# Patient Record
Sex: FEMALE | Race: WHITE | NOT HISPANIC OR LATINO | Employment: OTHER | URBAN - NONMETROPOLITAN AREA
[De-identification: names, ages, dates, MRNs, and addresses within clinical notes are randomized per-mention and may not be internally consistent; named-entity substitution may affect disease eponyms.]

---

## 2017-01-24 ENCOUNTER — OFFICE VISIT (OUTPATIENT)
Dept: ORTHOPEDIC SURGERY | Facility: CLINIC | Age: 62
End: 2017-01-24

## 2017-01-24 DIAGNOSIS — M17.31 POST-TRAUMATIC OSTEOARTHRITIS OF RIGHT KNEE: Primary | ICD-10-CM

## 2017-01-24 PROCEDURE — 99214 OFFICE O/P EST MOD 30 MIN: CPT | Performed by: ORTHOPAEDIC SURGERY

## 2017-01-24 RX ORDER — PYRAZINAMIDE 500 MG/1
1-2 TABLET ORAL NIGHTLY PRN
Qty: 40 TABLET | Refills: 0 | Status: SHIPPED | OUTPATIENT
Start: 2017-01-24 | End: 2017-08-10 | Stop reason: SDUPTHER

## 2017-01-24 RX ORDER — TRAMADOL HYDROCHLORIDE 50 MG/1
TABLET ORAL
Refills: 0 | COMMUNITY
Start: 2016-12-09 | End: 2017-08-10

## 2017-01-24 NOTE — MR AVS SNAPSHOT
Aubrie CRISTINO Fab   1/24/2017 1:00 PM   Office Visit    Dept Phone:  [phone]   Encounter #:  [id number]    Provider:  Bernardino Shepard MD   Department:  [facility]                Your Full Care Plan              Your Updated Medication List          This list is accurate as of: 1/24/17  1:34 PM.  Always use your most recent med list.                acetaminophen-codeine 300-30 MG per tablet   Commonly known as:  TYLENOL #3       clindamycin 1 % gel   Commonly known as:  CLINDAGEL       clonazePAM 1 MG tablet   Commonly known as:  KlonoPIN       doxepin 10 MG capsule   Commonly known as:  SINEquan       DULoxetine 20 MG capsule   Commonly known as:  CYMBALTA       FLUVIRIN suspension injection   Generic drug:  Influenza Vac Typ A&B Surf Ant       hydrOXYzine 25 MG tablet   Commonly known as:  ATARAX       indomethacin 25 MG capsule   Commonly known as:  INDOCIN       isosorbide mononitrate 30 MG 24 hr tablet   Commonly known as:  IMDUR       LEVEMIR FLEXTOUCH 100 UNIT/ML injection   Generic drug:  insulin detemir       lisinopril 2.5 MG tablet   Commonly known as:  PRINIVIL,ZESTRIL       meloxicam 15 MG tablet   Commonly known as:  MOBIC       metoprolol tartrate 50 MG tablet   Commonly known as:  LOPRESSOR       MICRONIZED COLESTIPOL HCL 1 G tablet   Generic drug:  colestipol       minocycline 100 MG capsule   Commonly known as:  MINOCIN,DYNACIN       ONGLYZA 5 MG tablet   Generic drug:  SAXagliptin       pantoprazole 40 MG EC tablet   Commonly known as:  PROTONIX       simvastatin 40 MG tablet   Commonly known as:  ZOCOR       TOUJEO SOLOSTAR 300 UNIT/ML solution pen-injector   Generic drug:  Insulin Glargine       traMADol 50 MG tablet   Commonly known as:  ULTRAM       VENTOLIN  (90 BASE) MCG/ACT inhaler   Generic drug:  albuterol       VICTOZA 18 MG/3ML solution pen-injector   Generic drug:  Liraglutide                  Instructions     None    Patient Instructions History      Upcoming Appointments     Visit Type Date Time Department    FOLLOW UP 1/24/2017  1:00 PM [facility]      [facility] Signup     Our records indicate that your [facility] account has been deactivated. If you would like to reactivate your account, please email [email or web address] or call [phone] to talk to our [facility] staff.             Other Info from Your Visit           Allergies     Contrast Dye  Itching      Reason for Visit     Right Knee - Follow-up     Results MRI RIGHT KNEE      Vital Signs     Smoking Status                   Never Smoker

## 2017-01-24 NOTE — PROGRESS NOTES
Chief Complaint   Patient presents with   • Right Knee - Follow-up   • Results     MRI RIGHT KNEE   Patient is here today for a right knee follow -up. She had an MRI done of the right knee and is here for results today.        HPI patient and his pain and discomfort on the right knee.  The knee periodically will swell up and cause her to limp.  She has difficulty with flexion of the knee and cannot squat on the ground because of the effusion.  She states it hurts in her all the time.  Because of her right knee she is limping and that is making her left knee hurt significantly as well.  She is also complaining of low back pain which sharp stabbing pain radiating into the hip associated with buttock and thigh pain.  Difficulty in going up and down the steps.  She has kept up her job at the [employer] that she finds that at the end of the day she is limping and she is barely able to keep up with her job description and requirements.  She has tried intra-articular injections, using a brace and Celebrex and all of these have helped her to some degree to tolerate her pain and discomfort but no lasting relief has been provided to the patient.  She is looking for significant improvement in her quality of life and therefore starting to me about scheduling a total knee arthroplasty on the right knee at this point.        There were no vitals filed for this visit.        Review of Systems   Constitutional: Negative for chills, diaphoresis, fever and unexpected weight change.   HENT: Negative for hearing loss, nosebleeds, sore throat and tinnitus.    Eyes: Negative for pain and visual disturbance.   Respiratory: Negative for cough, shortness of breath and wheezing.    Cardiovascular: Negative for chest pain and palpitations.   Gastrointestinal: Negative for abdominal pain, diarrhea, nausea and vomiting.   Endocrine: Negative for cold intolerance, heat intolerance and polydipsia.   Genitourinary: Negative for  difficulty urinating, dysuria and hematuria.   Musculoskeletal: Negative for arthralgias, joint swelling and myalgias.   Skin: Negative for rash and wound.   Allergic/Immunologic: Negative for environmental allergies.   Neurological: Negative for dizziness, syncope and numbness.   Hematological: Does not bruise/bleed easily.   Psychiatric/Behavioral: Negative for dysphoric mood and sleep disturbance. The patient is not nervous/anxious.            Physical Exam   Constitutional: She is oriented to person, place, and time. She appears well-developed and well-nourished.   HENT:   Head: Normocephalic.   Eyes: Conjunctivae are normal. Pupils are equal, round, and reactive to light.   Neck: Normal range of motion. Neck supple. No JVD present.   Cardiovascular: Normal rate, normal heart sounds and intact distal pulses.    Pulmonary/Chest: Effort normal and breath sounds normal. No respiratory distress.   Abdominal: Soft. Bowel sounds are normal. There is no tenderness. There is no guarding.   Lymphadenopathy:     She has no cervical adenopathy.   Neurological: She is alert and oriented to person, place, and time. She has normal reflexes.   Skin: Skin is warm and dry.   Psychiatric: She has a normal mood and affect. Her behavior is normal. Judgment and thought content normal.   Vitals reviewed.          Joint/Body Part Specific Exam:  right knee. Patient has crepitus throughout range of motion. Positive patellar grind test. Mild effusion. Lachman is negative. Pivot shift is negative. Anterior and posterior drawer signs are negative. Significant joint line tenderness is noted on the medial aspect of the knee. Patient has a varus orientation of the knee. Range of motion in flexion is for 0- 110 degrees. Neurovascular status intact.  Dorsalis pedis and posterior tibial artery pulses are palpable. Common peroneal nerve function is well preserved. Patients gait is cautious and antalgic. Skin and soft tissues are swollen;  consistent with synovitis and effusion      X-RAY Report:  Prior films are reviewed which show a complete loss of medial joint space with osteophyte formation and subchondral cyst formation      Diagnostics:  MRI reports from [facility] show progressive arthrosis on the medial compartment the knee with subchondral edema on the medial femoral condyle.  There is high-grade chondromalacia of the medial tibial plateau and the patellofemoral joint.  There is full-thickness radial tear of the posterior horn of the medial meniscus was extensively degenerative lateral meniscus.  There is also degenerative, mucinous change of the ACL.    Aubrie was seen today for results and follow-up.    Diagnoses and all orders for this visit:    Post-traumatic osteoarthritis of right knee    Other orders  -     acetaminophen-codeine (TYLENOL/CODEINE #3) 300-30 MG per tablet; Take 1-2 tablets by mouth At Night As Needed for moderate pain (4-6).          Procedures        Plan:  Patient has been struggling with her knee pain for several months and now wants to proceed with the surgical intervention.  She has a lot of pain and discomfort or day-to-day basis and she is done her best to keep up her job albeit with some restrictions.    She wants to proceed with a total aortoplasty of the right side which we will go and schedule for her.    Recent benefits discussed with patient great detail.    Time spent in the office today with the discussion of the patient, interpreting her MRI and making the recommendations of surgery along with discussion of the risks and benefits is 30 minutes.      Continue with current work status.    She can continue to work with a chiropractor for her low back pain.    Given her prescription for Tylenol with codeine No. 3 tab 1 by mouth daily at bedtime when necessary pain total 40 pills no refills.    Controlled substance treatment options discussed in detail. Patient's signed consent to medical options on file.  LYNN form in chart. Risks of narcotic medication usage outlined.  Possibility of physical and psychological dependence and abuse, especially long term, emphasized to the patient.      The patient was seen in the office today for preoperative discussion.  The patient has been tried on over-the-counter and prescription NSAID's despite the risks of anti-inflammatory bleeding, peptic ulcers and erosive gastritis with short term benefit only.  Braces have been prescribed for mechanical support.  Patient has been participating in an exercise program specifically targeting joint pain relief with limited benefit. Intraarticular injections have been used periodically with some but not complete relief of pain.  Ambulation aids have also been utilized.      The details of the surgical procedure were explained including the location of probable incisions and a description of the likely hardware/grafts to be used. The patient understands the likely convalescence after surgery as well as the rehabilitation required.  Also, we have thoroughly discussed with the patient the risks, benefits and alternatives to surgery.  Risks include but are not limited to the risk of infection, joint stiffness, limited range of motion, wound healing problems, scar tissue build up, myocardial infarction, stroke, blood clots (including DVT and/or pulmonary embolus along with the risk of death) neurologic and/or vascular injury, limb length discrepancy, fracture, dislocation, nonunion, malunion, continued pain and need for further surgery including hardware failure requiring revision.

## 2017-02-14 ENCOUNTER — TELEPHONE (OUTPATIENT)
Dept: ORTHOPEDIC SURGERY | Facility: CLINIC | Age: 62
End: 2017-02-14

## 2017-02-14 NOTE — TELEPHONE ENCOUNTER
Ok to schedule surgery as it is approved by her [employer] insurance  Im not sure what else i can offer her to minimse her symptoms?? Does she want some temporary relief with a cortisone injection Maybe?  SM

## 2017-02-14 NOTE — TELEPHONE ENCOUNTER
"Patient is asking what she needs to do in the meantime while she waits for her surgery \"it is killing me to walk while I work\" she works at [employer] in [place]. Offered her a appt on 3/9 but she states that will not do her any good. She is requesting you call her at [phone].   "

## 2017-03-02 ENCOUNTER — CLINICAL SUPPORT (OUTPATIENT)
Dept: ORTHOPEDIC SURGERY | Facility: CLINIC | Age: 62
End: 2017-03-02

## 2017-03-02 DIAGNOSIS — M17.31 POST-TRAUMATIC OSTEOARTHRITIS OF RIGHT KNEE: Primary | ICD-10-CM

## 2017-03-02 DIAGNOSIS — M17.12 PRIMARY OSTEOARTHRITIS OF LEFT KNEE: ICD-10-CM

## 2017-03-02 PROCEDURE — 20610 DRAIN/INJ JOINT/BURSA W/O US: CPT | Performed by: ORTHOPAEDIC SURGERY

## 2017-03-02 PROCEDURE — 99214 OFFICE O/P EST MOD 30 MIN: CPT | Performed by: ORTHOPAEDIC SURGERY

## 2017-03-02 RX ADMIN — METHYLPREDNISOLONE ACETATE 160 MG: 80 INJECTION, SUSPENSION INTRA-ARTICULAR; INTRALESIONAL; INTRAMUSCULAR; SOFT TISSUE at 13:33

## 2017-03-02 RX ADMIN — LIDOCAINE HYDROCHLORIDE 2 ML: 10 INJECTION, SOLUTION INFILTRATION; PERINEURAL at 13:33

## 2017-03-02 NOTE — PROGRESS NOTES
Chief Complaint   Patient presents with   • Right Knee - Follow-up           HPI the patient is here today for a follow up of her right knee. She is requesting a cortisone injection to this knee today.  She has continuous pain and discomfort in the knee.  The knee continues to buckle and give out from underneath her.  She has been struggling with this injury and its related sequelae for at least 1 year.  Patient is tried every form of conservative nonoperative care including the use of a brace, anti-inflammatory medication, intra-articular injections and physical therapy.  She has tried to go back to work and has been successful to some degree in maintaining her job at the [employer].  She now wants a more durable solution to her knee problems and wants to proceed with the option of a knee arthroplasty.  The risks and benefits have been discussed with the patient extensively.        There were no vitals filed for this visit.        Review of Systems   All other systems reviewed and are negative.          Physical Exam   Constitutional: She appears well-developed and well-nourished.   HENT:   Head: Normocephalic and atraumatic.   Eyes: EOM are normal. Pupils are equal, round, and reactive to light.   Neck: Normal range of motion. Neck supple.   Cardiovascular: Intact distal pulses.    Pulmonary/Chest: Effort normal and breath sounds normal.   Abdominal: Soft. Bowel sounds are normal.   Musculoskeletal: Normal range of motion. She exhibits tenderness. She exhibits no deformity.   Neurological: She is alert. She has normal reflexes.   Skin: Skin is warm.   Psychiatric: She has a normal mood and affect. Her behavior is normal. Thought content normal.   Nursing note and vitals reviewed.          Joint/Body Part Specific Exam:  right knee. Patient has crepitus throughout range of motion. Positive patellar grind test. Mild effusion. Lachman is negative. Pivot shift is negative. Anterior and posterior drawer signs  are negative. Significant joint line tenderness is noted on the medial aspect of the knee. Patient has a varus orientation of the knee. Range of motion in flexion is for 0- 110° degrees. Neurovascular status intact.  Dorsalis pedis and posterior tibial artery pulses are palpable. Common peroneal nerve function is well preserved. Patients gait is cautious and antalgic. Skin and soft tissues are swollen; consistent with synovitis and effusion      X-RAY Report:  Prior x-rays and MRIs are reviewed which show varus deformity the knee and significant loss of medial joint space with irregular articular surface contour and osteophyte formation      Diagnostics:        Aubrie was seen today for follow-up.    Diagnoses and all orders for this visit:    Post-traumatic osteoarthritis of right knee            Large Joint Arthrocentesis  Date/Time: 3/2/2017 1:33 PM  Consent given by: patient  Site marked: site marked  Timeout: Immediately prior to procedure a time out was called to verify the correct patient, procedure, equipment, support staff and site/side marked as required   Supporting Documentation  Indications: pain   Procedure Details  Location: knee - R knee  Preparation: Patient was prepped and draped in the usual sterile fashion  Needle size: 25 G  Approach: anteromedial  Medications administered: 2 mL lidocaine 1 %; 160 mg methylPREDNISolone acetate 80 MG/ML  Patient tolerance: patient tolerated the procedure well with no immediate complications              Plan:  Injected the patient's right knee with a steroid from an anteromedial approach.    Gentle active mobilization of the knee including the quads and the hamstrings.    I'm giving the patient half a day off today because usually after the injection in her knee flares up and become swollen and she finds it difficult to stand on a concrete floor to complete her shift.    Use a brace for support of knee function.    We are going to go ahead and schedule her for a  right total knee arthroplasty.    Risks and benefits discussed with patient great detail to the extent of 30 minutes in the office.    Tablet ibuprofen 600 mg tab 1 by mouth twice a day when necessary pain and discomfort.    Follow-up in my office after the surgical intervention.    The patient was seen in the office today for preoperative discussion.  The patient has been tried on over-the-counter and prescription NSAID's despite the risks of anti-inflammatory bleeding, peptic ulcers and erosive gastritis with short term benefit only.  Braces have been prescribed for mechanical support.  Patient has been participating in an exercise program specifically targeting joint pain relief with limited benefit. Intraarticular injections have been used periodically with some but not complete relief of pain.  Ambulation aids have also been utilized.      The details of the surgical procedure were explained including the location of probable incisions and a description of the likely hardware/grafts to be used. The patient understands the likely convalescence after surgery as well as the rehabilitation required.  Also, we have thoroughly discussed with the patient the risks, benefits and alternatives to surgery.  Risks include but are not limited to the risk of infection, joint stiffness, limited range of motion, wound healing problems, scar tissue build up, myocardial infarction, stroke, blood clots (including DVT and/or pulmonary embolus along with the risk of death) neurologic and/or vascular injury, limb length discrepancy, fracture, dislocation, nonunion, malunion, continued pain and need for further surgery including hardware failure requiring revision.

## 2017-03-08 RX ORDER — LIDOCAINE HYDROCHLORIDE 10 MG/ML
2 INJECTION, SOLUTION INFILTRATION; PERINEURAL
Status: COMPLETED | OUTPATIENT
Start: 2017-03-02 | End: 2017-03-02

## 2017-03-08 RX ORDER — METHYLPREDNISOLONE ACETATE 80 MG/ML
160 INJECTION, SUSPENSION INTRA-ARTICULAR; INTRALESIONAL; INTRAMUSCULAR; SOFT TISSUE
Status: COMPLETED | OUTPATIENT
Start: 2017-03-02 | End: 2017-03-02

## 2017-04-17 ENCOUNTER — OUTSIDE FACILITY SERVICE (OUTPATIENT)
Dept: ORTHOPEDIC SURGERY | Facility: CLINIC | Age: 62
End: 2017-04-17

## 2017-04-17 PROCEDURE — 27447 TOTAL KNEE ARTHROPLASTY: CPT | Performed by: ORTHOPAEDIC SURGERY

## 2017-04-17 PROCEDURE — 20985 CPTR-ASST DIR MS PX: CPT | Performed by: ORTHOPAEDIC SURGERY

## 2017-05-12 ENCOUNTER — TELEPHONE (OUTPATIENT)
Dept: ORTHOPEDIC SURGERY | Facility: CLINIC | Age: 62
End: 2017-05-12

## 2017-05-12 ENCOUNTER — OFFICE VISIT (OUTPATIENT)
Dept: ORTHOPEDIC SURGERY | Facility: CLINIC | Age: 62
End: 2017-05-12

## 2017-05-12 DIAGNOSIS — Z96.651 STATUS POST TOTAL KNEE REPLACEMENT, RIGHT: Primary | ICD-10-CM

## 2017-05-12 PROCEDURE — 99024 POSTOP FOLLOW-UP VISIT: CPT | Performed by: ORTHOPAEDIC SURGERY

## 2017-05-12 RX ORDER — OXYCODONE HYDROCHLORIDE AND ACETAMINOPHEN 5; 325 MG/1; MG/1
1 TABLET ORAL EVERY 8 HOURS PRN
Qty: 45 TABLET | Refills: 0 | Status: SHIPPED | OUTPATIENT
Start: 2017-05-12 | End: 2017-08-10

## 2017-05-12 RX ORDER — BACLOFEN 10 MG/1
TABLET ORAL
Qty: 180 TABLET | Refills: 0 | Status: SHIPPED | OUTPATIENT
Start: 2017-05-12 | End: 2017-08-08 | Stop reason: SDUPTHER

## 2017-05-17 PROBLEM — Z96.651 STATUS POST TOTAL KNEE REPLACEMENT, RIGHT: Status: ACTIVE | Noted: 2017-05-17

## 2017-06-13 ENCOUNTER — OFFICE VISIT (OUTPATIENT)
Dept: ORTHOPEDIC SURGERY | Facility: CLINIC | Age: 62
End: 2017-06-13

## 2017-06-13 DIAGNOSIS — Z96.651 STATUS POST TOTAL KNEE REPLACEMENT, RIGHT: Primary | ICD-10-CM

## 2017-06-13 PROCEDURE — 99024 POSTOP FOLLOW-UP VISIT: CPT | Performed by: ORTHOPAEDIC SURGERY

## 2017-06-13 RX ORDER — FERROUS SULFATE 325(65) MG
TABLET ORAL
Refills: 0 | COMMUNITY
Start: 2017-05-02 | End: 2021-08-13

## 2017-06-13 RX ORDER — BUSPIRONE HYDROCHLORIDE 15 MG/1
TABLET ORAL
Refills: 3 | COMMUNITY
Start: 2017-05-18 | End: 2021-08-13

## 2017-06-13 RX ORDER — OXYCODONE AND ACETAMINOPHEN 10; 325 MG/1; MG/1
TABLET ORAL
Refills: 0 | COMMUNITY
Start: 2017-04-05 | End: 2017-08-10

## 2017-06-13 RX ORDER — SENNA-LAX 8.6 MG/1
TABLET ORAL
Refills: 0 | COMMUNITY
Start: 2017-05-10 | End: 2021-08-13

## 2017-06-13 RX ORDER — PAROXETINE HYDROCHLORIDE 20 MG/1
TABLET, FILM COATED ORAL
Refills: 1 | COMMUNITY
Start: 2017-03-31 | End: 2021-07-21 | Stop reason: SDUPTHER

## 2017-06-13 RX ORDER — PROMETHAZINE HYDROCHLORIDE 25 MG/1
TABLET ORAL
Refills: 0 | COMMUNITY
Start: 2017-04-05 | End: 2021-08-13

## 2017-06-13 RX ORDER — MONTELUKAST SODIUM 10 MG/1
10 TABLET ORAL DAILY
Refills: 2 | COMMUNITY
Start: 2017-05-18

## 2017-06-13 NOTE — PROGRESS NOTES
Chief Complaint   Patient presents with   • Right Knee - Follow-up         HPI  Patient returns for a post operative follow up of her right knee.  Patient had a total knee arthroplasty performed on 17 April 2017.  She is doing extremely well at this point.  His swelling and bruising about was completely settled down.  Her range of motion is progressively improving with physical therapy.  She does not have any significant pain at this point.  In fact she states she is not using any of her narcotic pain medication to help manage her symptoms.  She is thinking about it returning back to work possibly in the next 3-4 weeks based on her progress.  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        There were no vitals filed for this visit.        Joint/Body Part Specific Exam:    right knee.The patient is status post total knee arthroplasty postoperative 7 week(s). Incision is clean. Calf is soft and nontender. Homans sign is negative. There is no clicking, popping or catching. Anterior and posterior drawer signs are negative, Appropriate amounts of swelling and bruising are noted. Dorsalis pedis and posterior tibial artery pulses are palpable. Common peroneal nerve function is well preserved. Range of motion is from 5- 105° degrees. Gait is cautious but otherwise fairly normal. There is no evidence of a deep seated joint infection.    X-RAY REPORT:        Aubrie was seen today for follow-up.    Diagnoses and all orders for this visit:    Status post total knee replacement, right          Procedures        Instructions:      Plan:Incision care.    Stretching and strengthening exercises of the quads and the hamstrings.    Tablet ibuprofen 6 mg tab 1 by mouth twice a day when necessary breakthrough pain.    She is not taking any pain  medications that contain narcotic at this point.    I would recommend her using a Jobst compression garment stocking, long, above the knee and with 20-30° compression.    Patient needs to consider going back to work as a  worker with a sit down type of job.    Continue with aggressive outpatient physical therapy.    Patient's Workmen's Comp. Nurse,Ailyn De Santiago RN, is here from [facility] to ask questions about the progress and postoperative needs for physical therapy for this patient.  She is also here to determine return to work and time off work issues.      Follow-up in my office in 4 weeks.

## 2017-07-13 ENCOUNTER — OFFICE VISIT (OUTPATIENT)
Dept: ORTHOPEDIC SURGERY | Facility: CLINIC | Age: 62
End: 2017-07-13

## 2017-07-13 DIAGNOSIS — Z96.651 STATUS POST TOTAL KNEE REPLACEMENT, RIGHT: Primary | ICD-10-CM

## 2017-07-13 PROCEDURE — 73560 X-RAY EXAM OF KNEE 1 OR 2: CPT | Performed by: ORTHOPAEDIC SURGERY

## 2017-07-13 PROCEDURE — 99024 POSTOP FOLLOW-UP VISIT: CPT | Performed by: ORTHOPAEDIC SURGERY

## 2017-07-13 NOTE — PROGRESS NOTES
Chief Complaint   Patient presents with   • Right Knee - Follow-up         HPI  The patient is here today for a follow up of her right total knee arthroplasty that was done on 4/17/17.  Patient has made good progress postoperatively.  It appears that her physical therapy has plateaued out at this point.  She is able to flex with maximal effort about 112° but with her own effort she can go up to about 105°.  The patient complains of some stiffness on the lateral side of the knee joint after a night's rest.  She is also complaining of some numbness and tingling on the lateral 2 toes.  This is possibly related to scar tissue in the vicinity of the common peroneal nerve.  At this point my recommendation would be to go ahead with a manipulation of the knee under anesthesia to break down some of the adhesions and to allow her about 5-7 more degrees of flexion.  I do feel like this will improve her gait patterns and facilitate a quicker return to work.  The the manipulation of the knee will be followed by aggressive daily physical therapy for a week followed by 3 times a week physical therapy for the next 4-6 weeks.  We have discussed that with the patient and her workman's compensation rehabilitation nurse was present in the office today.      There were no vitals filed for this visit.        Joint/Body Part Specific Exam:  right knee.The patient is status post total knee arthroplasty postoperative 3 month(s). Incision is clean. Calf is soft and nontender. Homans sign is negative. There is no clicking, popping or catching. Anterior and posterior drawer signs are negative, Appropriate amounts of swelling and bruising are noted. Dorsalis pedis and posterior tibial artery pulses are palpable. Common peroneal nerve function is well preserved. Range of motion is from 0- 105° degrees. Gait is cautious but otherwise fairly normal. There is no evidence of a deep seated joint infection.      X-RAY REPORT:  right Knee  X-Ray  Indication: Right knee evaluation of implant position after total knee arthroplasty  AP, Lateral views  Findings: Well placed implants with a good cement mantle without any subsidence  no bony lesion  Soft tissues within normal limits  within normal limits joint spaces  Hardware appropriately positioned yes      yes prior studies available for comparison.    X-RAY was ordered and reviewed by Bernardino Shepard MD      Aubrie was seen today for follow-up.    Diagnoses and all orders for this visit:    Status post total knee replacement, right  -     XR Knee 1 or 2 View Right          Procedures        Instructions:      Plan:Continue to wear the Jobst compression garment stocking to decrease the swelling and to minimize the possibility of DVT.    Continue with aggressive physical therapy with stretching sensing exercises.    I am recommending that we schedule this patient for a right knee manipulation under anesthesia and a block to break down some of the adhesions and to improve her range of motion in flexion by about 5 more degrees.    This manipulation will help to decrease her pain which is related to scar tissue and also to improve her range of motion to allow an earlier return to a light duty work.    The manipulation under anesthesia will be followed by aggressive physical therapy to help improve the overall dynamics of knee function and gait and danitza patterns.    Tablet ibuprofen 600 mg tab 1 by mouth twice a day when necessary pain and discomfort.    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    Patients workman's comp nurse, Ailyn Mcclellan RN from [place] is here today and all questions have been answered for her.  She will try and facilitate the manipulation procedure for the patient which we would like to accomplish ASAP.  Time spent in the office today with the patient and her workman's comp nurse  discussing different treatment options and conversation with the patient's physical therapist Mohsen Tay is 30 minutes.

## 2017-07-14 ENCOUNTER — TELEPHONE (OUTPATIENT)
Dept: ORTHOPEDIC SURGERY | Facility: CLINIC | Age: 62
End: 2017-07-14

## 2017-07-14 NOTE — TELEPHONE ENCOUNTER
SPOKE WITH [employer]EVER ROJAS () FOR [facility]. SHE STATED THAT SHE DID REQUEST APPROVAL FOR THE RIGHT KNEE MANIPULATION AND HAD FORWARDED THE OFFICE NOTES TO ROSANNE ROBBINS YESTERDAY BUT HAD NOT HEARD ANYTHING BACK FROM HER YET.I CALLED ROSANNE [phone] AND HAD TO LEAVE A MESSAGE ASKING THAT SHE RETURN MY CALL TO LET ME KNOW THE STATUS OF THE REQUEST.

## 2017-07-17 ENCOUNTER — OUTSIDE FACILITY SERVICE (OUTPATIENT)
Dept: ORTHOPEDIC SURGERY | Facility: CLINIC | Age: 62
End: 2017-07-17

## 2017-07-17 PROCEDURE — 27570 FIXATION OF KNEE JOINT: CPT | Performed by: ORTHOPAEDIC SURGERY

## 2017-07-25 ENCOUNTER — TELEPHONE (OUTPATIENT)
Dept: ORTHOPEDIC SURGERY | Facility: CLINIC | Age: 62
End: 2017-07-25

## 2017-07-31 ENCOUNTER — TELEPHONE (OUTPATIENT)
Dept: ORTHOPEDIC SURGERY | Facility: CLINIC | Age: 62
End: 2017-07-31

## 2017-08-01 ENCOUNTER — TELEPHONE (OUTPATIENT)
Dept: ORTHOPEDIC SURGERY | Facility: CLINIC | Age: 62
End: 2017-08-01

## 2017-08-07 ENCOUNTER — TELEPHONE (OUTPATIENT)
Dept: ORTHOPEDIC SURGERY | Facility: CLINIC | Age: 62
End: 2017-08-07

## 2017-08-07 NOTE — TELEPHONE ENCOUNTER
Patient states she is still on pain medication I have called Ailyn and left a voicemail stating the patient will need transportation until re-evaluated on 8/10/17 due to her still taking pain medication.

## 2017-08-07 NOTE — TELEPHONE ENCOUNTER
Work Comp is asking if the patient can drive at this point please advise. They are still providing her with transportation but if it is not needed then they will d/c this.     [phone] Ailyn Dang RN Case Manager.

## 2017-08-08 ENCOUNTER — TELEPHONE (OUTPATIENT)
Dept: ORTHOPEDIC SURGERY | Facility: CLINIC | Age: 62
End: 2017-08-08

## 2017-08-08 RX ORDER — BACLOFEN 10 MG/1
10 TABLET ORAL 2 TIMES DAILY
Qty: 180 TABLET | Refills: 0 | Status: SHIPPED | OUTPATIENT
Start: 2017-08-08 | End: 2017-11-10 | Stop reason: SDUPTHER

## 2017-08-10 ENCOUNTER — OFFICE VISIT (OUTPATIENT)
Dept: ORTHOPEDIC SURGERY | Facility: CLINIC | Age: 62
End: 2017-08-10

## 2017-08-10 DIAGNOSIS — M17.31 POST-TRAUMATIC OSTEOARTHRITIS OF RIGHT KNEE: Primary | ICD-10-CM

## 2017-08-10 DIAGNOSIS — Z96.651 STATUS POST TOTAL KNEE REPLACEMENT, RIGHT: ICD-10-CM

## 2017-08-10 PROCEDURE — 99213 OFFICE O/P EST LOW 20 MIN: CPT | Performed by: ORTHOPAEDIC SURGERY

## 2017-08-10 RX ORDER — PYRAZINAMIDE 500 MG/1
1 TABLET ORAL EVERY 12 HOURS PRN
Qty: 40 TABLET | Refills: 0 | Status: SHIPPED | OUTPATIENT
Start: 2017-08-10 | End: 2021-08-13

## 2017-08-10 RX ORDER — DOXEPIN HYDROCHLORIDE 25 MG/1
CAPSULE ORAL
Refills: 1 | COMMUNITY
Start: 2017-07-21 | End: 2021-08-13

## 2017-08-10 RX ORDER — CEFDINIR 300 MG/1
CAPSULE ORAL
Refills: 1 | COMMUNITY
Start: 2017-06-23 | End: 2021-08-13

## 2017-08-10 RX ORDER — FLUTICASONE PROPIONATE 50 MCG
SPRAY, SUSPENSION (ML) NASAL
Refills: 2 | COMMUNITY
Start: 2017-06-16 | End: 2021-08-13

## 2017-08-10 RX ORDER — VORTIOXETINE 10 MG/1
TABLET, FILM COATED ORAL
Refills: 1 | COMMUNITY
Start: 2017-06-22 | End: 2021-08-13

## 2017-08-10 RX ORDER — HYDROCODONE BITARTRATE AND ACETAMINOPHEN 5; 325 MG/1; MG/1
TABLET ORAL
Refills: 0 | COMMUNITY
Start: 2017-07-17 | End: 2017-08-10

## 2017-08-10 RX ORDER — ONDANSETRON 4 MG/1
TABLET, FILM COATED ORAL
Refills: 0 | COMMUNITY
Start: 2017-07-17 | End: 2021-08-13

## 2017-08-10 NOTE — PROGRESS NOTES
Chief Complaint   Patient presents with   • Right Knee - Follow-up   • Post-op Knee     RIGHT KNEE MANIPULATION   Patient is here today for a follow-up after a right knee manipulation.      HPI patient is doing well in terms of improvement in range of motion.  She had her index knee replacement on 17 April 2017.  This was followed by a knee manipulation on 17 July 2017.  We manipulated the knee to overcome  the arthrofibrosis and to improve the range of motion.  There was a significant breaking down of the scar tissue in the perioperative period and she has made very significant improvement in terms of improvement so for range of motion and decreased pain subsequent to the manipulation.        There were no vitals filed for this visit.        Joint/Body Part Specific Exam:  right knee.The patient is status post total knee arthroplasty postoperative 4 month(s). Incision is clean. Calf is soft and nontender. Homans sign is negative. There is no clicking, popping or catching. Anterior and posterior drawer signs are negative, Appropriate amounts of swelling and bruising are noted. Dorsalis pedis and posterior tibial artery pulses are palpable. Common peroneal nerve function is well preserved. Range of motion is from 5- 105 degrees. Gait is cautious but otherwise fairly normal. There is no evidence of a deep seated joint infection.      X-RAY REPORT:        Aubrie was seen today for post-op knee and follow-up.    Diagnoses and all orders for this visit:    Post-traumatic osteoarthritis of right knee    Status post total knee replacement, right    Other orders  -     acetaminophen-codeine (TYLENOL/CODEINE #3) 300-30 MG per tablet; Take 1 tablet by mouth Every 12 (Twelve) Hours As Needed for Moderate Pain (4-6).          Procedures        Instructions:      Plan:Continue with aggressive physical therapy including stretching and strengthening exercises.    Long DAPHNEY hose stockings to decrease swelling and minimize the  possibility of DVT.    Tablet ibuprofen 600 mg tab 1 by mouth twice a day when necessary pain and discomfort.    Tablet Tylenol with Codeine No. 3 tab 1 by mouth every 12 hours when necessary pain and discomfort for breakthrough pain.    Continue with aggressive home based physical therapy program.    Patient is currently off work so that she can focus on physical therapy and Prepared to return to work at her next visit in 4 weeks.    Patients workman's compensation nurse, Ailyn Mcclellan RN, from [facility] is here and has discussed the progress of the patient and potential return to work.    Follow-up in 4 weeks for reevaluation.    The patient will continue with physical therapy on an outpatient basis.    Controlled substance treatment options discussed in detail. Patient's signed consent to medical options on file. LYNN form in chart. Risks of narcotic medication usage outlined.  Possibility of physical and psychological dependence and abuse, especially long term, emphasized to the patient.  I have explained to the patient that we will try to wean them off the narcotic medication as soon as possible and introduce non-narcotic modalities of pain control.  I have also discussed the possibility of random drug testing as well as pill counts to prevent misuse and misappropriation of the narcotic medications prescribed to the patient.

## 2017-08-31 DIAGNOSIS — Z96.651 STATUS POST TOTAL KNEE REPLACEMENT, RIGHT: Primary | ICD-10-CM

## 2017-08-31 DIAGNOSIS — M17.31 POST-TRAUMATIC OSTEOARTHRITIS OF RIGHT KNEE: ICD-10-CM

## 2017-09-07 ENCOUNTER — OFFICE VISIT (OUTPATIENT)
Dept: ORTHOPEDIC SURGERY | Facility: CLINIC | Age: 62
End: 2017-09-07

## 2017-09-07 DIAGNOSIS — Z96.651 STATUS POST TOTAL KNEE REPLACEMENT, RIGHT: Primary | ICD-10-CM

## 2017-09-07 PROCEDURE — 99213 OFFICE O/P EST LOW 20 MIN: CPT | Performed by: ORTHOPAEDIC SURGERY

## 2017-09-07 NOTE — PROGRESS NOTES
Chief Complaint   Patient presents with   • Right Knee - Follow-up         HPI the patient is here today for a follow up of her right total knee arthroplasty. She had this knee replaced on 4/17/17 and a manipulation on 7/17/17.  Patient is doing a whole lot better than before.  The active range of motion with physical therapy and the manipulation helped her tremendously.  Her range of motion is improving significantly.  She is ambulating without too much pain at this point.  She is talking to me about being released to go back to work to a light duty schedule.  She is unable to work full-time on her regular duties at this point because of the pain and discomfort that she experiences by the end of the day on a concrete floor.  However she is amenable to returning back to a light duty schedule.  We will discuss this with the patient's Workmen's Compensation nurse from [facility] today.        There were no vitals filed for this visit.        Joint/Body Part Specific Exam:  right knee.The patient is status post total knee arthroplasty postoperative 5 month(s). Incision is clean. Calf is soft and nontender. Homans sign is negative. There is no clicking, popping or catching. Anterior and posterior drawer signs are negative, Appropriate amounts of swelling and bruising are noted. Dorsalis pedis and posterior tibial artery pulses are palpable. Common peroneal nerve function is well preserved. Range of motion is from 0- 120° degrees. Gait is cautious but otherwise fairly normal. There is no evidence of a deep seated joint infection.  Patients gait pattern has improved significantly.  She does have some tenderness posteriorly over the popliteal fossa.      X-RAY REPORT:        Aubrie was seen today for follow-up.    Diagnoses and all orders for this visit:    Status post total knee replacement, right          Procedures        Instructions:      Plan: Continue with aggressive physical therapy with stretching and  strengthening exercises of the quads and the hamstrings.    Tablet ibuprofen 600 mg by mouth twice a day p.m. pain and discomfort.    Calcium and vitamin D for bone health.    , GI and dental procedure prophylaxis with antibiotics to prevent a metastatic infection to the knee arthroplasty implants.    Patient states that there is no light duty available at her job at the [employer].    We are giving the patient a note to return to light duty with affect from 11 September 2017.  There will be no squatting, crawling or climbing on ladders or scaffolds.  She will be limited to lifting and maximum load of 20 pounds and no more.  These restrictions are in place till her next follow-up in 6 weeks.    Patients workman's compensation nurse, Melissa Helton RN, is present here in the office today and all these issues have been discussed with her in great detail.    Follow-up in my office in 6 weeks for reevaluation.

## 2017-10-19 ENCOUNTER — OFFICE VISIT (OUTPATIENT)
Dept: ORTHOPEDIC SURGERY | Facility: CLINIC | Age: 62
End: 2017-10-19

## 2017-10-19 DIAGNOSIS — Z96.651 S/P TOTAL KNEE ARTHROPLASTY, RIGHT: Primary | ICD-10-CM

## 2017-10-19 DIAGNOSIS — Z96.651 STATUS POST TOTAL KNEE REPLACEMENT, RIGHT: ICD-10-CM

## 2017-10-19 PROCEDURE — 99213 OFFICE O/P EST LOW 20 MIN: CPT | Performed by: ORTHOPAEDIC SURGERY

## 2017-10-19 PROCEDURE — 73560 X-RAY EXAM OF KNEE 1 OR 2: CPT | Performed by: ORTHOPAEDIC SURGERY

## 2017-10-19 NOTE — PROGRESS NOTES
Chief Complaint   Patient presents with   • Right Knee - Follow-up           HPI the patient is here today for a follow up of her right total knee arthroplasty (4/17/17) and manipulation (7/17/17)Off the knee for arthrofibrosis.  She is doing quite well at this point.  The swelling and pain have decreased considerably.  She is not taking any narcotic medication to help with the pain and symptoms.  Patient has returned back to work at her job at the [employer].  She is working 2 days a week and seems to be doing okay with that work load.  By the end of the day, she does have pain and discomfort in the lumbar spine which causes her to get tired.  The knee replacement itself is doing well for her and her walking distances have improved considerably.  She feels that her quality of life is a whole lot better after the knee replacement surgery compared to the pain and instability she had in the knee prior to the surgical intervention.        There were no vitals filed for this visit.        Review of Systems   Constitutional: Negative.    HENT: Negative.    Eyes: Negative.    Respiratory: Negative.    Cardiovascular: Negative.    Gastrointestinal: Negative.    Endocrine: Negative.    Genitourinary: Negative.    Musculoskeletal: Negative.    Skin: Negative.    Allergic/Immunologic: Negative.    Neurological: Negative.    Hematological: Negative.    Psychiatric/Behavioral: Negative.            Physical Exam   Constitutional: She is oriented to person, place, and time. She appears well-nourished.   HENT:   Head: Atraumatic.   Eyes: EOM are normal.   Neck: Neck supple.   Cardiovascular: Intact distal pulses.    Pulmonary/Chest: Breath sounds normal.   Abdominal: Bowel sounds are normal.   Musculoskeletal: She exhibits edema and tenderness. She exhibits no deformity.   Neurological: She is alert and oriented to person, place, and time. She has normal reflexes.   Skin: Skin is dry.   Psychiatric: She has a normal mood and  affect. Her behavior is normal. Judgment and thought content normal.   Nursing note and vitals reviewed.          Joint/Body Part Specific Exam:  right knee.The patient is status post total knee arthroplasty postoperative 6 month(s). Incision is clean. Calf is soft and nontender. Homans sign is negative. There is no clicking, popping or catching. Anterior and posterior drawer signs are negative.  There is no instability of the components. Appropriate amounts of swelling and bruising are noted. Dorsalis pedis and posterior tibial artery pulses are palpable. Common peroneal nerve function is well preserved. Range of motion is from 0- 110° degrees of flexion. Gait is cautious but otherwise fairly normal. There is no evidence of a deep seated joint infection.  Overall she is very pleased with her outcome because her gait pattern has improved tremendously.      X-RAY Report:  right Knee X-Ray  Indication: Implant position evaluation after total knee arthro-plasty  AP, Lateral views  Findings: Well placed implants with a good cement mantle without any subsidence  no bony lesion  Soft tissues within normal limits  within normal limits joint spaces  Hardware appropriately positioned yes      yes prior studies available for comparison.    X-RAY was ordered and reviewed by Bernardino Shepard MD      Diagnostics:        Aubrie was seen today for follow-up.    Diagnoses and all orders for this visit:    S/P total knee arthroplasty, right  -     XR Knee 1 or 2 View Right    Status post total knee replacement, right          Procedures        Plan:  Continue with aggressive stretching and strengthening exercises of the knee including stretching of the quads and the hamstrings.    Tablet ibuprofen 600 mg orally twice a day for pain and discomfort.    , GI and dental procedure prophylaxis with antibiotics to prevent a metastatic infection to the knee arthroplasty implants.    [employer] exercise to keep the low back supple  and to prevent it from hurting by the end of the day.    Patient is back working in her job at "[employer] 2 days a week and she will continue that same work status at this point.    Jobst compression garment stocking to help decrease the swelling and minimize the possibility of DVT.    Follow-up in my office in 3 months for reevaluation.

## 2017-10-24 PROBLEM — Z96.651 S/P TOTAL KNEE ARTHROPLASTY, RIGHT: Status: ACTIVE | Noted: 2017-10-24

## 2017-10-26 ENCOUNTER — TELEPHONE (OUTPATIENT)
Dept: ORTHOPEDIC SURGERY | Facility: CLINIC | Age: 62
End: 2017-10-26

## 2017-11-10 RX ORDER — BACLOFEN 10 MG/1
TABLET ORAL
Qty: 180 TABLET | Refills: 0 | Status: SHIPPED | OUTPATIENT
Start: 2017-11-10 | End: 2018-01-25

## 2017-12-18 ENCOUNTER — OFFICE VISIT (OUTPATIENT)
Dept: ORTHOPEDIC SURGERY | Facility: CLINIC | Age: 62
End: 2017-12-18

## 2017-12-18 DIAGNOSIS — Z96.651 STATUS POST TOTAL KNEE REPLACEMENT, RIGHT: Primary | ICD-10-CM

## 2017-12-18 PROCEDURE — 99214 OFFICE O/P EST MOD 30 MIN: CPT | Performed by: ORTHOPAEDIC SURGERY

## 2017-12-18 NOTE — PROGRESS NOTES
Chief Complaint   Patient presents with   • Right Knee - Follow-up     TKA 4/17/17           HPI the patient is here today for a follow up of her right total knee arthroplasty that was done on 04/17/17.  Patient had developed some arthrofibrosis postoperatively which was treated with manipulation.  She is doing extremely well at this point.  Her range of motion is improved tremendously.  She walks regularly for exercise.  The patient has gone back to work at her job at [employer] with some restrictions.  At this point these restrictions are limited to not allowing her to crawl or climb ladders.  There is no squatting allowed for this patient and maximum lifting allowed us 20 pounds.  She is not using any narcotics for pain medication to control symptoms around the knee.  She does have significant amount of chronic low back pain which radiates into the lower extremity.  The patient is seeking chiropractic care for that symptom.  She is also using baclofen as an anti-spasmodic agent for muscle spasms in the lower extremity.  We have discussed about progressive improvement with the patient and eventually she will get the status of a MRI at the one-year fausto from surgery.        There were no vitals filed for this visit.        Review of Systems   Constitutional: Negative.    HENT: Negative.    Eyes: Negative.    Respiratory: Negative.    Cardiovascular: Negative.    Gastrointestinal: Negative.    Endocrine: Negative.    Genitourinary: Negative.    Musculoskeletal: Negative.    Skin: Negative.    Allergic/Immunologic: Negative.    Neurological: Negative.    Hematological: Negative.    Psychiatric/Behavioral: Negative.            Physical Exam   Constitutional: She is oriented to person, place, and time. She appears well-nourished.   HENT:   Head: Atraumatic.   Eyes: EOM are normal.   Neck: Neck supple.   Cardiovascular: Normal rate, regular rhythm, normal heart sounds and intact distal pulses.    Pulmonary/Chest:  Effort normal and breath sounds normal.   Abdominal: Soft. Bowel sounds are normal.   Musculoskeletal: She exhibits edema and tenderness.   Neurological: She is alert and oriented to person, place, and time. She has normal reflexes.   Skin: Skin is dry.   Psychiatric: She has a normal mood and affect. Her behavior is normal. Judgment and thought content normal.   Nursing note and vitals reviewed.          Joint/Body Part Specific Exam:  right knee.The patient is status post total knee arthroplasty postoperative 8 month(s). Incision is clean. Calf is soft and nontender. Homans sign is negative. There is no clicking, popping or catching. Anterior and posterior drawer signs are negative.  There is no instability of the components. Appropriate amounts of swelling and bruising are noted. Dorsalis pedis and posterior tibial artery pulses are palpable. Common peroneal nerve function is well preserved. Range of motion is from 0- 125° degrees of flexion. Gait is cautious but otherwise fairly normal. There is no evidence of a deep seated joint infection.      X-RAY Report:        Diagnostics:        Aubrie was seen today for follow-up.    Diagnoses and all orders for this visit:    Status post total knee replacement, right          Procedures        Plan:  Stretching and strengthening exercises will continue rehabilitation of the quads and the hamstrings.    Jobst compression garment stocking to help decrease the swelling and edema.    Tablet ibuprofen 600 mg orally twice a day for pain and discomfort.    She is using baclofen for the pain and spasms that come out of her low back and affect the lower extremity.    She will have to see a chiropractic practitioner to help her with her low back pain.    , GI and dental procedure prophylaxis with antibiotics to prevent a metastatic infection to the knee implants.    She is currently working at [employer] with restrictions.  Her current restrictions include no climbing ladders or  scaffolds, no squatting or crawling on the ground and maximum lifting of 20 pounds to minimize injury to the operative knee.    Follow-up in my office in 6 weeks.    Patient's Workmen's Comp. carrier representative,Ailyn Dang RN,BSN is here today to discuss the patient's work limitations as well as continued physical therapy and potential for maximal medical improvement.  Time spent in this discussion today with the patient and her Workmen's Comp. nurse is 30 minutes of which greater than 50% of the time was spent face-to-face discussing the treatment options which at this point are temporary in nature.  We have also discussed about making those restrictions of a more permanent nature at her next visit.

## 2017-12-22 PROBLEM — G56.02 CARPAL TUNNEL SYNDROME OF LEFT WRIST: Status: ACTIVE | Noted: 2017-12-22

## 2017-12-22 PROBLEM — G56.21 ULNAR NEURITIS, RIGHT: Status: ACTIVE | Noted: 2017-12-22

## 2018-01-25 ENCOUNTER — OFFICE VISIT (OUTPATIENT)
Dept: ORTHOPEDIC SURGERY | Facility: CLINIC | Age: 63
End: 2018-01-25

## 2018-01-25 DIAGNOSIS — Z96.651 S/P TOTAL KNEE ARTHROPLASTY, RIGHT: Primary | ICD-10-CM

## 2018-01-25 DIAGNOSIS — M54.16 CHRONIC LUMBAR RADICULOPATHY: ICD-10-CM

## 2018-01-25 PROCEDURE — 99214 OFFICE O/P EST MOD 30 MIN: CPT | Performed by: ORTHOPAEDIC SURGERY

## 2018-01-25 RX ORDER — METAXALONE 800 MG/1
800 TABLET ORAL NIGHTLY PRN
Qty: 40 TABLET | Refills: 0 | Status: SHIPPED | OUTPATIENT
Start: 2018-01-25 | End: 2018-03-21 | Stop reason: SDUPTHER

## 2018-03-21 ENCOUNTER — TELEPHONE (OUTPATIENT)
Dept: ORTHOPEDIC SURGERY | Facility: CLINIC | Age: 63
End: 2018-03-21

## 2018-03-21 RX ORDER — METAXALONE 800 MG/1
800 TABLET ORAL NIGHTLY PRN
Qty: 30 TABLET | Refills: 1 | Status: SHIPPED | OUTPATIENT
Start: 2018-03-21

## 2018-03-21 NOTE — TELEPHONE ENCOUNTER
Patient came into the office today stating that the skelaxin really helped her more than anything so she has went to her PCP Dr. Melani Bae and she has agreed to prescribe the medication for the patient. But work comp will not cover it due to us being the only physician she is seeing with work comp. Would you agree to prescribe this for the patient for on going care? Please Advise.

## 2018-03-21 NOTE — TELEPHONE ENCOUNTER
Yes I will be okay with prescribing her the Skelaxin.  However, the patient does need to understand that this will not be an open ended prescription and at some point she will have to contact the workman's comp carrier to see who is covering her for the chronic low back pain .  I believe that it was a chiropractor who had seen her for that and that is most likely who she should get the prescription form.  I don't want it to be a long-term thing with me because I have done her knee replacement and that is doing well and that's about all I need to be involved with.  Thank you

## 2018-04-19 ENCOUNTER — OFFICE VISIT (OUTPATIENT)
Dept: ORTHOPEDIC SURGERY | Facility: CLINIC | Age: 63
End: 2018-04-19

## 2018-04-19 VITALS — BODY MASS INDEX: 34.49 KG/M2 | WEIGHT: 202 LBS | HEIGHT: 64 IN | TEMPERATURE: 97.5 F

## 2018-04-19 DIAGNOSIS — Z96.651 STATUS POST TOTAL KNEE REPLACEMENT, RIGHT: Primary | ICD-10-CM

## 2018-04-19 PROCEDURE — 99213 OFFICE O/P EST LOW 20 MIN: CPT | Performed by: ORTHOPAEDIC SURGERY

## 2018-04-19 RX ORDER — DOXEPIN HYDROCHLORIDE 50 MG/1
50 CAPSULE ORAL NIGHTLY
COMMUNITY
Start: 2018-04-10 | End: 2021-10-26

## 2018-04-19 RX ORDER — ALOGLIPTIN 25 MG/1
TABLET, FILM COATED ORAL
COMMUNITY
Start: 2018-03-27 | End: 2021-08-13

## 2018-04-19 RX ORDER — AMOXICILLIN 500 MG/1
CAPSULE ORAL
COMMUNITY
Start: 2018-04-13 | End: 2021-08-13

## 2018-04-19 NOTE — PROGRESS NOTES
Chief Complaint   Patient presents with   • Right Knee - Follow-up           HPI  Patient is here today for a follow up of her right knee.  Patient had a total knee arthroplasty about one year ago.  She is doing quite well in terms of the knee replacement.  Range of motion is improved significantly.  She does not have any buckling or giving out of the knee.  Her walking distance is improved significantly.  She continues to have pain and discomfort in both the hips.  The left hip is more symptomatic than the right.  She states that the hip pain was present prior to the injury that led to her knee replacement but it has since gotten a whole lot worse.  She describes her pain as a dull ache on the posterior aspect of the hip and the SI joint.  Radiates into the buttock and the thigh.  She is having a difficult time at work where she states they're are only giving her 11 hours of work a week which is not enough to sustain her employment.  She is going to persist with this arrangement until she can make arrangements for her health insurance.  She is getting chiropractic care to both her left and her right hips and I've encouraged her to possibly seeing an MRI to make sure she does not have any traumatic issues to the articular cartilage versus avascular necrosis.        Vitals:    04/19/18 1550   Temp: 97.5 °F (36.4 °C)           Review of Systems   Constitutional: Negative.    HENT: Negative.    Eyes: Negative.    Respiratory: Negative.    Cardiovascular: Negative.    Gastrointestinal: Negative.    Endocrine: Negative.    Genitourinary: Negative.    Musculoskeletal: Positive for joint swelling.   Skin: Negative.    Allergic/Immunologic: Negative.    Neurological: Negative.    Hematological: Negative.    Psychiatric/Behavioral: Negative.            Physical Exam   Constitutional: She is oriented to person, place, and time. She appears well-nourished.   HENT:   Head: Atraumatic.   Eyes: EOM are normal.   Neck: Neck  supple.   Cardiovascular: Normal rate, regular rhythm, normal heart sounds and intact distal pulses.    Pulmonary/Chest: Effort normal and breath sounds normal.   Abdominal: Bowel sounds are normal.   Musculoskeletal: She exhibits edema and tenderness. She exhibits no deformity.   Neurological: She is alert and oriented to person, place, and time. She has normal reflexes.   Skin: Skin is dry. Capillary refill takes 2 to 3 seconds.   Psychiatric: She has a normal mood and affect. Her behavior is normal. Judgment and thought content normal.   Vitals reviewed.          Joint/Body Part Specific Exam:  right knee.The patient is status post total knee arthroplasty postoperative 12 month(s). Incision is clean and fully healed. Calf is soft and nontender. Homans sign is negative. There is no clicking, popping or catching. Anterior and posterior drawer signs are negative.  There is no instability of the components. Appropriate amounts of swelling and bruising are noted. Dorsalis pedis and posterior tibial artery pulses are palpable. Common peroneal nerve function is well preserved. Range of motion is from 0- 125 degrees of flexion. Gait is cautious but otherwise fairly normal. There is no evidence of a deep seated joint infection.      X-RAY Report:        Diagnostics:        Aubrie was seen today for follow-up.    Diagnoses and all orders for this visit:    Status post total knee replacement, right            Procedures        Plan:  Weightbearing as tolerated with stretching and strengthening exercises of the quads and the hamstrings.    , GI and dental procedure prophylaxis with antibiotics to prevent a metastatic infection to the knee arthroplasty implants.    Continue with her current status at the [employer] where she is working.    Really injury precautions.    Tablet ibuprofen 600 mg orally twice a day for pain swelling and discomfort.    She is complaining of left hip pain and will most likely require an  MRI of that hip.  That can be coordinated with the patient's PCP or her chiropractor where she is receiving care for her low back pain.    Falls precautions.    Follow-up in my office in 6 months for reevaluation.

## 2018-06-08 ENCOUNTER — TELEPHONE (OUTPATIENT)
Dept: ORTHOPEDIC SURGERY | Facility: CLINIC | Age: 63
End: 2018-06-08

## 2018-06-08 NOTE — TELEPHONE ENCOUNTER
PATIENT CALLED AND IS REQUESTING A COPY OF HER RIGHT KNEE XRAYS.  PATIENT'S WORK COMP HAS REFERRED HER TO ANOTHER PHYSICIAN AND SHE NEEDS THE XRAYS FOR THAT PHYSICIAN TO VIEW.  PATIENT IS AWARE THAT OUR CD BURNER IS DOWN AND THAT IT WILL BE THE END OF NEXT WEEK POSSIBLY BEFORE THE DISC IS READY.

## 2018-06-13 NOTE — TELEPHONE ENCOUNTER
S/w pt. She said the disc is not needed anymore but because we have prepared it she will p/u. XR-ED

## 2018-10-23 ENCOUNTER — OFFICE VISIT (OUTPATIENT)
Dept: ORTHOPEDIC SURGERY | Facility: CLINIC | Age: 63
End: 2018-10-23

## 2018-10-23 DIAGNOSIS — Z96.651 S/P TOTAL KNEE ARTHROPLASTY, RIGHT: Primary | ICD-10-CM

## 2018-10-23 PROCEDURE — 99213 OFFICE O/P EST LOW 20 MIN: CPT | Performed by: ORTHOPAEDIC SURGERY

## 2018-10-25 ENCOUNTER — OFFICE VISIT CONVERTED (OUTPATIENT)
Dept: GASTROENTEROLOGY | Facility: CLINIC | Age: 63
End: 2018-10-25
Attending: INTERNAL MEDICINE

## 2018-10-25 ENCOUNTER — CONVERSION ENCOUNTER (OUTPATIENT)
Dept: GASTROENTEROLOGY | Facility: CLINIC | Age: 63
End: 2018-10-25

## 2019-08-22 ENCOUNTER — OFFICE VISIT (OUTPATIENT)
Dept: ORTHOPEDIC SURGERY | Facility: CLINIC | Age: 64
End: 2019-08-22

## 2019-08-22 DIAGNOSIS — Z96.651 HISTORY OF TOTAL KNEE ARTHROPLASTY, RIGHT: Primary | ICD-10-CM

## 2019-08-22 DIAGNOSIS — Z96.651 S/P TOTAL KNEE ARTHROPLASTY, RIGHT: ICD-10-CM

## 2019-08-22 PROCEDURE — 73560 X-RAY EXAM OF KNEE 1 OR 2: CPT | Performed by: ORTHOPAEDIC SURGERY

## 2019-08-22 PROCEDURE — 99213 OFFICE O/P EST LOW 20 MIN: CPT | Performed by: ORTHOPAEDIC SURGERY

## 2019-09-01 PROBLEM — Z96.651 HISTORY OF TOTAL KNEE ARTHROPLASTY, RIGHT: Status: ACTIVE | Noted: 2019-09-01

## 2020-08-17 DIAGNOSIS — Z96.651 S/P TOTAL KNEE ARTHROPLASTY, RIGHT: Primary | ICD-10-CM

## 2020-08-19 ENCOUNTER — HOSPITAL ENCOUNTER (OUTPATIENT)
Dept: OTHER | Facility: HOSPITAL | Age: 65
Discharge: HOME | End: 2020-08-19
Attending: ORTHOPAEDIC SURGERY

## 2020-08-20 ENCOUNTER — OFFICE VISIT (OUTPATIENT)
Dept: ORTHOPEDIC SURGERY | Facility: CLINIC | Age: 65
End: 2020-08-20

## 2020-08-20 VITALS — TEMPERATURE: 97.3 F | BODY MASS INDEX: 36.86 KG/M2 | WEIGHT: 208 LBS | HEIGHT: 63 IN

## 2020-08-20 DIAGNOSIS — Z96.651 S/P TOTAL KNEE ARTHROPLASTY, RIGHT: Primary | ICD-10-CM

## 2020-08-20 PROCEDURE — 99213 OFFICE O/P EST LOW 20 MIN: CPT | Performed by: ORTHOPAEDIC SURGERY

## 2020-08-20 NOTE — PROGRESS NOTES
FOLLOW UP VISIT    Patient: Aubrie Sanon  ?  YOB: 1955    MRN: [id number]  ?  Chief Complaint   Patient presents with   • Right Knee - Pain, Follow-up      ?  HPI:  YR F/U R TKA 4/17/17  Aubrie Torrez is following up on a total knee arthroplasty performed by me for posttraumatic osteoarthritis.  She had sustained an injury while she was at work at the [employer] and this had progressed onto posttraumatic osteoarthritis.  She underwent the total knee arthroplasty on 17 April 2017.  She states that she has done extremely well following that surgery.  Range of motion of the knee improved considerably with physical therapy.  She did have an initial episode of arthrofibrosis with limited range of motion which subsequently progressed to an improved range of motion.  She does have very mild pain at this time.  She has since retired from her job at [employer].  She states that she is able to ambulate for exercise without any pain or discomfort.  She is not taking any narcotic medication for pain control at this time.  Overall, she is very pleased with her outcome.  She states that her other knee has been bothering her and she will talk to me about that at a separate visit.      Pain Location: RIGHT knee  Radiation: none  Quality: dull  Intensity/Severity: none  Duration: 4 years  Onset quality: gradual   Timing: intermittent  Aggravating Factors: going up and down stairs  Alleviating Factors: NSAIDs  Previous Episodes: none mentioned  Associated Symptoms: decreased strength  ADLs Affected: work related activities  Previous Treatment: patient underwent a total knee replacement on 4/17/17    This patient is an established patient.  This problem is not new to this examiner.      Allergies:   Allergies   Allergen Reactions   • Contrast Dye Itching       Medications:   Home Medications:  Current Outpatient Medications on File Prior to Visit   Medication Sig   • acetaminophen-codeine  (TYLENOL/CODEINE #3) 300-30 MG per tablet Take 1 tablet by mouth Every 12 (Twelve) Hours As Needed for Moderate Pain (4-6).   • Alogliptin Benzoate 25 MG tablet    • amoxicillin (AMOXIL) 500 MG capsule    • busPIRone (BUSPAR) 15 MG tablet TK 1 T PO TID   • cefdinir (OMNICEF) 300 MG capsule TK 1 C PO BID UNTIL GONE   • clindamycin (CLINDAGEL) 1 % gel APPLY TOPICALLY WITH APPLICATOR BID   • clonazePAM (KlonoPIN) 1 MG tablet TK 1 T PO BID   • doxepin (SINEquan) 10 MG capsule TK ONE C PO QHS   • doxepin (SINEquan) 25 MG capsule TK 1 TO 2 CS PO QHS PRF SLEEP   • doxepin (SINEquan) 50 MG capsule    • DULoxetine (CYMBALTA) 20 MG capsule TK ONE C PO ONCE D.   • Elastic Bandages & Supports (JOBST KNEE HIGH COMPRESSION SM) misc 1 package Daily.   • ferrous sulfate 325 (65 FE) MG tablet TK 1 T PO  QD   • fluticasone (FLONASE) 50 MCG/ACT nasal spray USE 2 SPRAYS IN EACH NOSTRIL QD   • FLUVIRIN suspension injection ADM 0.5ML IM UTD   • hydrOXYzine (ATARAX) 25 MG tablet TK 1 T PO QHS   • indomethacin (INDOCIN) 25 MG capsule TK 1 C PO TID WITH FOOD PRN   • isosorbide mononitrate (IMDUR) 30 MG 24 hr tablet TK 1 T PO D   • LEVEMIR FLEXTOUCH 100 UNIT/ML injection INJECT 85 UNITS SQ QHS   • lisinopril (PRINIVIL,ZESTRIL) 2.5 MG tablet TK 1 T PO D UTD   • meloxicam (MOBIC) 15 MG tablet TK 1 T PO QD   • metaxalone (SKELAXIN) 800 MG tablet Take 1 tablet by mouth At Night As Needed for Muscle Spasms.   • metoprolol tartrate (LOPRESSOR) 50 MG tablet TK 1 T PO ONCE D.   • MICRONIZED COLESTIPOL HCL 1 G tablet TK 1 T PO BID   • minocycline (MINOCIN,DYNACIN) 100 MG capsule    • montelukast (SINGULAIR) 10 MG tablet TK 1 T PO QD   • ondansetron (ZOFRAN) 4 MG tablet TK 1 T PO  Q 6 H PRN N   • ONGLYZA 5 MG tablet TK 1 T PO D   • pantoprazole (PROTONIX) 40 MG EC tablet TK 1 T PO  QD   • PARoxetine (PAXIL) 20 MG tablet TK 1 T PO D   • promethazine (PHENERGAN) 25 MG tablet TK 1 T PO  Q 4 TO 6 H PRN   • SENNA LAX 8.6 MG tablet TK 1 T PO BID   •  simvastatin (ZOCOR) 40 MG tablet TK 1 T PO HS.   • TOUJEO SOLOSTAR 300 UNIT/ML solution pen-injector INJECT 72 UNITS SQ QD   • TRINTELLIX 10 MG tablet TK 1 T PO  QAM   • VENTOLIN  (90 BASE) MCG/ACT inhaler INL 1 TO 2 PFS PO Q 4 TO 6 H PRN   • VICTOZA 18 MG/3ML solution pen-injector INJECT 1.6 MG QD     No current facility-administered medications on file prior to visit.      Current Medications:  Scheduled Meds:  PRN Meds:.    I have reviewed the patient's medical history in detail and updated the computerized patient record.  Review and summarization of old records include:    Past Medical History:   Diagnosis Date   • Diabetes (CMS/HCC)      Past Surgical History:   Procedure Laterality Date   • BLADDER REPAIR     • HYSTERECTOMY       Social History     Occupational History   • Not on file   Tobacco Use   • Smoking status: Never Smoker   • Smokeless tobacco: Never Used   Substance and Sexual Activity   • Alcohol use: No   • Drug use: No   • Sexual activity: Defer      Family History   Problem Relation Age of Onset   • Colon cancer Paternal Grandmother          Review of Systems   Constitutional: Negative for chills, fever and unexpected weight change.   HENT: Negative for trouble swallowing and voice change.    Eyes: Negative for visual disturbance.   Respiratory: Negative for cough and shortness of breath.    Cardiovascular: Negative for chest pain and leg swelling.   Gastrointestinal: Negative for abdominal pain, nausea and vomiting.   Endocrine: Negative for cold intolerance and heat intolerance.   Genitourinary: Negative for difficulty urinating, frequency and urgency.   Skin: Negative for rash and wound.   Allergic/Immunologic: Negative for immunocompromised state.   Neurological: Negative for weakness and numbness.   Hematological: Does not bruise/bleed easily.   Psychiatric/Behavioral: Negative for dysphoric mood. The patient is not nervous/anxious.           Wt Readings from Last 3 Encounters:  "  08/20/20 94.3 kg (208 lb)   04/19/18 91.6 kg (202 lb)   04/13/16 86.2 kg (190 lb)     Ht Readings from Last 3 Encounters:   08/20/20 160 cm (63\")   04/19/18 162.6 cm (64\")   04/13/16 160 cm (63\")     Body mass index is 36.85 kg/m².  Facility age limit for growth percentiles is 20 years.  Vitals:    08/20/20 1259   Temp: 97.3 °F (36.3 °C)         Physical Exam  Constitutional: Patient is oriented to person, place, and time. Appears well-developed and well-nourished.   HENT:   Head: Normocephalic and atraumatic.   Eyes: Conjunctivae and EOM are normal. Pupils are equal, round, and reactive to light.   Cardiovascular: Normal rate, regular rhythm, normal heart sounds and intact distal pulses.   Pulmonary/Chest: Effort normal and breath sounds normal.   Musculoskeletal:   See detailed exam below   Neurological: Alert and oriented to person, place, and time. No sensory deficit. Coordination normal.   Skin: Skin is warm and dry. Capillary refill takes less than 2 seconds. No rash noted. No erythema.   Psychiatric: Patient has a normal mood and affect. Her behavior is normal. Judgment and thought content normal.   Nursing note and vitals reviewed.      Ortho Exam:     Right knee.The patient is status post total knee arthroplasty postoperative 3.5 year(s). Incision is clean. Calf is soft and nontender. Homans sign is negative. There is no clicking, popping or catching. Anterior and posterior drawer signs are negative.  There is no instability of the components. Appropriate amounts of swelling and bruising are noted. Dorsalis pedis and posterior tibial artery pulses are palpable. Common peroneal nerve function is well preserved. Range of motion is from 0-125 degrees of flexion. Gait is cautious but otherwise fairly normal. There is no evidence of a deep seated joint infection.        Diagnostics:  Right knee xrays ap/lateral views were ordered by Bernardino Shepard MD and performed at [facility]. These " images were independently viewed and interpreted by myself, my impression as follows:    right Knee X-Ray  Indication: evaluation of implant position after total knee arthroplasty  AP, Lateral views  Findings: well-placed implants with a good cement mantle and no subsidence of the implants  no bony lesion  Soft tissues within normal limits  within normal limits joint spaces  Hardware appropriately positioned yes      yes prior studies available for comparison.    X-RAY was ordered and reviewed by Bernardino Shepard MD  Assessment:  Aubrie was seen today for pain and follow-up.    Diagnoses and all orders for this visit:    S/P total knee arthroplasty, right          Procedures  ?    Plan    · Compression/brace to the knee to prevent it from buckling and giving her.  · , GI and dental procedure prophylaxis with antibiotics to prevent metastatic infection to the knee arthroplasty implants.  · Calcium and vitamin D for bone health.  · Glucosamine, chondroitin and turmeric for cartilage health.  · Rest, ice, compression, and elevation (RICE) therapy  · Stretching and strengthening exercises of the flexors and extensors of the knee.  · Alternate Ibuprofen and Tylenol as needed  · Follow up in 1 year with x-ray for reevaluation and joint surveillance.    Date of encounter: 08/20/2020   Bernardino Shepard MD

## 2021-05-16 VITALS
SYSTOLIC BLOOD PRESSURE: 120 MMHG | RESPIRATION RATE: 12 BRPM | DIASTOLIC BLOOD PRESSURE: 61 MMHG | BODY MASS INDEX: 36.22 KG/M2 | HEART RATE: 59 BPM | HEIGHT: 63 IN | WEIGHT: 204.44 LBS | OXYGEN SATURATION: 99 %

## 2021-05-30 NOTE — PROGRESS NOTES
----- Message from Yusuf Dey MD sent at 7/20/2019 12:58 PM CDT -----  Mail letter with results but also call Krishna and tell him that his PSA is higher than 6 months ago.  It is 7.4 now and was 5.9 at our office in January.  Our schedulers will be calling to arrange for follow-up appointment with Dr. Mckeon at [facility].  He needs to get prostate biopsy completed.   Chief Complaint   Patient presents with   • Right Knee - Follow-up           HPI  Patient returns for a follow up of her right knee arthroplasty that was performed by me in April 2017.  She states that her gait has improved tremendously since the surgical intervention.  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  She states that she has difficulty with kneeling which I have explained to her is a normal phenomena after total knee replacement.  That aspect of her function should return back to near normal.  She has recovered a near normal range of motion although initially she did have some problems with limitations of range of motion on account of arthrofibrosis.  She is not needing any pain medication for her symptoms from this knee.  She is able to walk for exercise and that has improved her quality of life in a significant fashion.  Overall, the patient is pleased with her postoperative outcome.  She did undergo an independent medical evaluation and the report of that exam is not available to me.  The patient did verbally conveyed to me that she received a 20% whole person impairment/disability.  I do feel that is a fair evaluation of her disability from her work related injury and subsequent knee replacement surgery.         There were no vitals filed for this visit.        Review of Systems   Constitutional: Negative.    HENT: Negative.    Eyes: Negative.    Respiratory: Negative.    Cardiovascular: Negative.    Gastrointestinal: Negative.    Endocrine: Negative.    Genitourinary: Negative.    Musculoskeletal: Positive for joint swelling.   Skin: Negative.    Allergic/Immunologic: Negative.    Neurological: Negative.    Hematological: Negative.    Psychiatric/Behavioral: Negative.            Physical Exam   Constitutional: She appears well-nourished.   HENT:   Head: Atraumatic.   Eyes: EOM are normal.   Neck: Neck supple.   Cardiovascular: Normal rate and regular rhythm.     Pulmonary/Chest: Breath sounds normal.   Abdominal: Soft.   Musculoskeletal: She exhibits tenderness.   Neurological: She is alert.   Skin: Skin is warm. Capillary refill takes 2 to 3 seconds.   Psychiatric: She has a normal mood and affect. Her behavior is normal. Judgment normal.   Nursing note and vitals reviewed.          Joint/Body Part Specific Exam:  right knee.The patient is status post total knee arthroplasty postoperative 1 year and 6 month(s). Incision is clean. Calf is soft and nontender. Homans sign is negative. There is no clicking, popping or catching. Anterior and posterior drawer signs are negative.  There is no instability of the components. Appropriate amounts of swelling and bruising are noted. Dorsalis pedis and posterior tibial artery pulses are palpable. Common peroneal nerve function is well preserved. Range of motion is from 0- 110° degrees of flexion. Gait is cautious but otherwise fairly normal. There is no evidence of a deep seated joint infection.      X-RAY Report:        Diagnostics:        Aubrie was seen today for follow-up.    Diagnoses and all orders for this visit:    S/P total knee arthroplasty, right            Procedures        Plan: Stretching and strengthening exercises of the quads and the hamstrings.    , GI and dental procedure prophylaxis with antibiotics to prevent a metastatic infection to the knee arthroplasty.    Tablet ibuprofen 600 mg orally daily at bedtime for pain swelling and discomfort.    Falls precautions.    Calcium and vitamin D for bone health.    Patient will return back to work at her regular job at the pharmacy where she is working 5 hours per week.    Follow-up in my office in 6 months.

## 2021-07-21 ENCOUNTER — OFFICE VISIT (OUTPATIENT)
Dept: UROLOGY | Facility: CLINIC | Age: 66
End: 2021-07-21

## 2021-07-21 VITALS — HEIGHT: 63 IN | WEIGHT: 211.6 LBS | RESPIRATION RATE: 12 BRPM | BODY MASS INDEX: 37.49 KG/M2

## 2021-07-21 DIAGNOSIS — R31.0 GROSS HEMATURIA: ICD-10-CM

## 2021-07-21 DIAGNOSIS — R31.29 MICROHEMATURIA: Primary | ICD-10-CM

## 2021-07-21 PROBLEM — K21.9 ESOPHAGEAL REFLUX: Status: ACTIVE | Noted: 2021-07-21

## 2021-07-21 PROBLEM — E11.9 DIABETES (HCC): Status: ACTIVE | Noted: 2021-07-21

## 2021-07-21 PROBLEM — F41.9 ANXIETY: Status: ACTIVE | Noted: 2021-07-21

## 2021-07-21 PROBLEM — K57.92 DIVERTICULITIS: Status: ACTIVE | Noted: 2021-07-21

## 2021-07-21 LAB
BILIRUB BLD-MCNC: NEGATIVE MG/DL
CLARITY, POC: CLEAR
COLOR UR: YELLOW
GLUCOSE UR STRIP-MCNC: ABNORMAL MG/DL
KETONES UR QL: NEGATIVE
LEUKOCYTE EST, POC: NEGATIVE
NITRITE UR-MCNC: NEGATIVE MG/ML
PH UR: 5 [PH] (ref 5–8)
PROT UR STRIP-MCNC: NEGATIVE MG/DL
RBC # UR STRIP: NEGATIVE /UL
SP GR UR: 1.01 (ref 1–1.03)
UROBILINOGEN UR QL: NORMAL

## 2021-07-21 PROCEDURE — 81003 URINALYSIS AUTO W/O SCOPE: CPT | Performed by: NURSE PRACTITIONER

## 2021-07-21 PROCEDURE — 99203 OFFICE O/P NEW LOW 30 MIN: CPT | Performed by: NURSE PRACTITIONER

## 2021-07-21 RX ORDER — DESVENLAFAXINE 25 MG/1
TABLET, EXTENDED RELEASE ORAL DAILY
COMMUNITY
End: 2021-08-13

## 2021-07-21 RX ORDER — LISINOPRIL 5 MG/1
TABLET ORAL
COMMUNITY
End: 2021-08-13

## 2021-07-21 RX ORDER — DESVENLAFAXINE 25 MG/1
TABLET, EXTENDED RELEASE ORAL
COMMUNITY
Start: 2021-06-23 | End: 2021-08-13

## 2021-07-21 RX ORDER — INSULIN DEGLUDEC INJECTION 100 U/ML
INJECTION, SOLUTION SUBCUTANEOUS
COMMUNITY
End: 2021-08-13

## 2021-07-21 RX ORDER — ISOSORBIDE MONONITRATE 10 MG/1
10 TABLET ORAL DAILY
COMMUNITY
End: 2021-10-26

## 2021-07-21 RX ORDER — LISINOPRIL 10 MG/1
10 TABLET ORAL DAILY
COMMUNITY
End: 2021-08-13

## 2021-07-21 RX ORDER — ASPIRIN 81 MG/1
162 TABLET, CHEWABLE ORAL DAILY
COMMUNITY

## 2021-07-21 RX ORDER — PAROXETINE 10 MG/1
TABLET, FILM COATED ORAL
COMMUNITY
End: 2021-08-13

## 2021-07-21 RX ORDER — METOPROLOL SUCCINATE 25 MG/1
TABLET, EXTENDED RELEASE ORAL
COMMUNITY
Start: 2021-06-14 | End: 2021-10-26

## 2021-07-21 RX ORDER — INSULIN HUMAN 100 [IU]/ML
80 INJECTION, SUSPENSION SUBCUTANEOUS EVERY MORNING
COMMUNITY
End: 2021-10-26

## 2021-07-21 RX ORDER — MELATONIN
3000 DAILY
COMMUNITY

## 2021-07-21 RX ORDER — LISINOPRIL 2.5 MG/1
2.5 TABLET ORAL DAILY
COMMUNITY

## 2021-07-21 RX ORDER — DIPHENHYDRAMINE HYDROCHLORIDE 50 MG/ML
50 INJECTION INTRAMUSCULAR; INTRAVENOUS ONCE
Status: DISCONTINUED | OUTPATIENT
Start: 2021-07-21 | End: 2021-10-26

## 2021-07-21 RX ORDER — NICOTINE POLACRILEX 2 MG
GUM BUCCAL
COMMUNITY
End: 2021-08-13

## 2021-07-21 NOTE — PROGRESS NOTES
Chief Complaint: Blood in Urine (Pt has seen blood in urine.  No pain.)    Subjective         History of Present Illness  Aubrie Sanon is a 65 y.o. female presents to [facility] to be seen for Hematuria.    She states that about 2 weeks ago when she went to void she saw blood on the toilet tissue.     She has not had any true microscopic hematuria. The only urinalysis with microscopy is from 3/24/21 which was with 0-2 RBC/ HPF.    No family history of  malignancies.     The patient is an intermittent smoker for several years but never daily.     She has had a hysterectomy.     She has had a urethral sling x 2.           Objective     Past Medical History:   Diagnosis Date   • Diabetes (CMS/HCC)        Past Surgical History:   Procedure Laterality Date   • BLADDER REPAIR     • HYSTERECTOMY     • KNEE SURGERY           Current Outpatient Medications:   •  aspirin 81 MG chewable tablet, Chew 81 mg Daily., Disp: , Rfl:   •  cholecalciferol (VITAMIN D3) 25 MCG (1000 UT) tablet, Take 1,000 Units by mouth Daily., Disp: , Rfl:   •  clonazePAM (KlonoPIN) 1 MG tablet, TK 1 T PO BID, Disp: , Rfl: 2  •  Cyanocobalamin (VITAMIN B-12 IJ), Inject  as directed., Disp: , Rfl:   •  Desvenlafaxine Succinate ER 25 MG tablet sustained-release 24 hour, , Disp: , Rfl:   •  doxepin (SINEquan) 50 MG capsule, , Disp: , Rfl:   •  empagliflozin (Jardiance) 25 MG tablet tablet, Take  by mouth Daily., Disp: , Rfl:   •  Insulin NPH, Human,, Isophane, (HumuLIN N KwikPen) 100 UNIT/ML injection, Humulin N NPH Insulin KwikPen 100 unit/mL (3 mL) subcutaneous insulin pen inject by subcutaneous route per prescriber's instructions. Insulin dosing requires individualization.   Active, Disp: , Rfl:   •  isosorbide mononitrate (ISMO,MONOKET) 10 MG tablet, , Disp: , Rfl:   •  LEVEMIR FLEXTOUCH 100 UNIT/ML injection, INJECT 85 UNITS SQ QHS, Disp: , Rfl: 6  •  lisinopril (PRINIVIL,ZESTRIL) 2.5 MG tablet, Take 2.5 mg by mouth  Daily., Disp: , Rfl:   •  metaxalone (SKELAXIN) 800 MG tablet, Take 1 tablet by mouth At Night As Needed for Muscle Spasms., Disp: 30 tablet, Rfl: 1  •  metoprolol succinate XL (TOPROL-XL) 25 MG 24 hr tablet, , Disp: , Rfl:   •  montelukast (SINGULAIR) 10 MG tablet, TK 1 T PO QD, Disp: , Rfl: 2  •  simvastatin (ZOCOR) 40 MG tablet, TK 1 T PO HS., Disp: , Rfl: 0  •  VENTOLIN  (90 BASE) MCG/ACT inhaler, INL 1 TO 2 PFS PO Q 4 TO 6 H PRN, Disp: , Rfl: 1  •  acetaminophen-codeine (TYLENOL/CODEINE #3) 300-30 MG per tablet, Take 1 tablet by mouth Every 12 (Twelve) Hours As Needed for Moderate Pain (4-6)., Disp: 40 tablet, Rfl: 0  •  Alogliptin Benzoate 25 MG tablet, , Disp: , Rfl:   •  amoxicillin (AMOXIL) 500 MG capsule, , Disp: , Rfl:   •  Biotin 1 MG capsule, , Disp: , Rfl:   •  busPIRone (BUSPAR) 15 MG tablet, TK 1 T PO TID, Disp: , Rfl: 3  •  cefdinir (OMNICEF) 300 MG capsule, TK 1 C PO BID UNTIL GONE, Disp: , Rfl: 1  •  clindamycin (CLINDAGEL) 1 % gel, APPLY TOPICALLY WITH APPLICATOR BID, Disp: , Rfl: 2  •  Desvenlafaxine Succinate ER 25 MG tablet sustained-release 24 hour, Take  by mouth Daily., Disp: , Rfl:   •  doxepin (SINEquan) 10 MG capsule, TK ONE C PO QHS, Disp: , Rfl: 5  •  doxepin (SINEquan) 25 MG capsule, TK 1 TO 2 CS PO QHS PRF SLEEP, Disp: , Rfl: 1  •  DULoxetine (CYMBALTA) 20 MG capsule, TK ONE C PO ONCE D., Disp: , Rfl: 0  •  Elastic Bandages & Supports (JOBST KNEE HIGH COMPRESSION SM) misc, 1 package Daily., Disp: 2 each, Rfl: 0  •  ferrous sulfate 325 (65 FE) MG tablet, TK 1 T PO  QD, Disp: , Rfl: 0  •  fluticasone (FLONASE) 50 MCG/ACT nasal spray, USE 2 SPRAYS IN EACH NOSTRIL QD, Disp: , Rfl: 2  •  FLUVIRIN suspension injection, ADM 0.5ML IM UTD, Disp: , Rfl: 0  •  hydrOXYzine (ATARAX) 25 MG tablet, TK 1 T PO QHS, Disp: , Rfl: 1  •  indomethacin (INDOCIN) 25 MG capsule, TK 1 C PO TID WITH FOOD PRN, Disp: , Rfl: 0  •  insulin degludec (Tresiba FlexTouch) 100 UNIT/ML solution pen-injector  "injection, , Disp: , Rfl:   •  lisinopril (PRINIVIL,ZESTRIL) 10 MG tablet, Take 10 mg by mouth Daily., Disp: , Rfl:   •  meloxicam (MOBIC) 15 MG tablet, TK 1 T PO QD, Disp: , Rfl: 3  •  metoprolol tartrate (LOPRESSOR) 50 MG tablet, TK 1 T PO ONCE D., Disp: , Rfl: 3  •  MICRONIZED COLESTIPOL HCL 1 G tablet, TK 1 T PO BID, Disp: , Rfl: 3  •  minocycline (MINOCIN,DYNACIN) 100 MG capsule, , Disp: , Rfl: 0  •  ondansetron (ZOFRAN) 4 MG tablet, TK 1 T PO  Q 6 H PRN N, Disp: , Rfl: 0  •  ONGLYZA 5 MG tablet, TK 1 T PO D, Disp: , Rfl: 5  •  pantoprazole (PROTONIX) 40 MG EC tablet, TK 1 T PO  QD, Disp: , Rfl: 3  •  promethazine (PHENERGAN) 25 MG tablet, TK 1 T PO  Q 4 TO 6 H PRN, Disp: , Rfl: 0  •  SENNA LAX 8.6 MG tablet, TK 1 T PO BID, Disp: , Rfl: 0  •  TOUJEO SOLOSTAR 300 UNIT/ML solution pen-injector, INJECT 72 UNITS SQ QD, Disp: , Rfl: 3  •  TRINTELLIX 10 MG tablet, TK 1 T PO  QAM, Disp: , Rfl: 1  •  VICTOZA 18 MG/3ML solution pen-injector, INJECT 1.6 MG QD, Disp: , Rfl: 3    Current Facility-Administered Medications:   •  diphenhydrAMINE (BENADRYL) injection 50 mg, 50 mg, Intravenous, Once, Lizbeth Houston, APRN  •  methylPREDNISolone sodium succinate (SOLU-Medrol) 40 mg in sodium chloride 0.9 % 100 mL IVPB, 40 mg, Intravenous, Q4H, Lizbeth Houston, APRN    Allergies   Allergen Reactions   • Contrast Dye Itching   • Soma [Carisoprodol] Hives        Family History   Problem Relation Age of Onset   • Colon cancer Paternal Grandmother        Social History     Socioeconomic History   • Marital status:      Spouse name: Not on file   • Number of children: Not on file   • Years of education: Not on file   • Highest education level: Not on file   Tobacco Use   • Smoking status: Current Some Day Smoker   • Smokeless tobacco: Never Used   Substance and Sexual Activity   • Alcohol use: No   • Drug use: No   • Sexual activity: Defer       Vital Signs:   Resp 12   Ht 160 cm (63\")   Wt 96 kg (211 lb 9.6 oz)   BMI " 37.48 kg/m²      Physical Exam     Result Review :   The following data was reviewed by: ARIAN Olivo on 07/21/2021:  Results for orders placed or performed in visit on 07/21/21   POC Urinalysis Dipstick, Automated    Specimen: Urine   Result Value Ref Range    Color Yellow Yellow, Straw, Dark Yellow, Connie    Clarity, UA Clear Clear    Specific Gravity  1.010 1.005 - 1.030    pH, Urine 5.0 5.0 - 8.0    Leukocytes Negative Negative    Nitrite, UA Negative Negative    Protein, POC Negative Negative mg/dL    Glucose, UA 1000 mg/dL (A) Negative, 1000 mg/dL (3+) mg/dL    Ketones, UA Negative Negative    Urobilinogen, UA Normal Normal    Bilirubin Negative Negative    Blood, UA Negative Negative          Procedures        Assessment and Plan    Diagnoses and all orders for this visit:    1. Microhematuria (Primary)  -     POC Urinalysis Dipstick, Automated    2. Gross hematuria  -     Cancel: CT Abdomen Pelvis With & Without Contrast; Future  -     methylPREDNISolone sodium succinate (SOLU-Medrol) 40 mg in sodium chloride 0.9 % 100 mL IVPB  -     diphenhydrAMINE (BENADRYL) injection 50 mg  -     CT Abdomen Pelvis With & Without Contrast; Future    Discussed with patient that given she is with gross hematuria further work-up is indicated at this point in time.  Patient will have a CT scan urology protocol performed and a cystoscopy to evaluate her lower urinary tracts.  We will call her with results of her CT scan.      I spent 21 minutes caring for Aubrie on this date of service. This time includes time spent by me in the following activities:reviewing tests, obtaining and/or reviewing a separately obtained history, performing a medically appropriate examination and/or evaluation , counseling and educating the patient/family/caregiver, ordering medications, tests, or procedures, and documenting information in the medical record  Follow Up   Return in about 4 weeks (around 8/18/2021) for With Dr. Silva for  Cystoscopy.  Patient was given instructions and counseling regarding her condition or for health maintenance advice. Please see specific information pulled into the AVS if appropriate.

## 2021-08-03 ENCOUNTER — HOSPITAL ENCOUNTER (OUTPATIENT)
Dept: CT IMAGING | Facility: HOSPITAL | Age: 66
Discharge: HOME | End: 2021-08-03

## 2021-08-03 DIAGNOSIS — R31.0 GROSS HEMATURIA: Primary | ICD-10-CM

## 2021-08-03 DIAGNOSIS — R31.0 GROSS HEMATURIA: ICD-10-CM

## 2021-08-03 RX ORDER — PREDNISONE 50 MG/1
50 TABLET ORAL DAILY
Qty: 3 TABLET | Refills: 0 | Status: SHIPPED | OUTPATIENT
Start: 2021-08-03 | End: 2021-08-13

## 2021-08-03 RX ORDER — DIPHENHYDRAMINE HCL 25 MG
25 CAPSULE ORAL ONCE
Qty: 2 CAPSULE | Refills: 0 | Status: SHIPPED | OUTPATIENT
Start: 2021-08-03 | End: 2021-08-03

## 2021-08-05 ENCOUNTER — HOSPITAL ENCOUNTER (OUTPATIENT)
Dept: CT IMAGING | Facility: HOSPITAL | Age: 66
Discharge: HOME | End: 2021-08-05
Admitting: NURSE PRACTITIONER

## 2021-08-05 LAB — CREAT BLDA-MCNC: 1.3 MG/DL

## 2021-08-05 PROCEDURE — 82565 ASSAY OF CREATININE: CPT

## 2021-08-05 PROCEDURE — 0 IOPAMIDOL PER 1 ML: Performed by: NURSE PRACTITIONER

## 2021-08-05 PROCEDURE — 74178 CT ABD&PLV WO CNTR FLWD CNTR: CPT

## 2021-08-05 RX ADMIN — IOPAMIDOL 100 ML: 755 INJECTION, SOLUTION INTRAVENOUS at 14:05

## 2021-08-06 ENCOUNTER — PREP FOR SURGERY (OUTPATIENT)
Dept: OTHER | Facility: HOSPITAL | Age: 66
End: 2021-08-06

## 2021-08-06 ENCOUNTER — OFFICE VISIT (OUTPATIENT)
Dept: UROLOGY | Facility: CLINIC | Age: 66
End: 2021-08-06

## 2021-08-06 VITALS
DIASTOLIC BLOOD PRESSURE: 50 MMHG | SYSTOLIC BLOOD PRESSURE: 112 MMHG | WEIGHT: 215 LBS | BODY MASS INDEX: 38.09 KG/M2 | HEIGHT: 63 IN

## 2021-08-06 DIAGNOSIS — R31.29 MICROSCOPIC HEMATURIA: Primary | ICD-10-CM

## 2021-08-06 DIAGNOSIS — R31.0 GROSS HEMATURIA: ICD-10-CM

## 2021-08-06 DIAGNOSIS — N32.9 LESION OF BLADDER: ICD-10-CM

## 2021-08-06 LAB
BILIRUB BLD-MCNC: NEGATIVE MG/DL
CLARITY, POC: CLEAR
COLOR UR: YELLOW
GLUCOSE UR STRIP-MCNC: ABNORMAL MG/DL
KETONES UR QL: NEGATIVE
LEUKOCYTE EST, POC: NEGATIVE
NITRITE UR-MCNC: NEGATIVE MG/ML
PH UR: 5.5 [PH] (ref 5–8)
PROT UR STRIP-MCNC: NEGATIVE MG/DL
RBC # UR STRIP: NEGATIVE /UL
SP GR UR: 1.02 (ref 1–1.03)
UROBILINOGEN UR QL: NORMAL

## 2021-08-06 PROCEDURE — 81003 URINALYSIS AUTO W/O SCOPE: CPT | Performed by: UROLOGY

## 2021-08-06 PROCEDURE — 52000 CYSTOURETHROSCOPY: CPT | Performed by: UROLOGY

## 2021-08-06 RX ORDER — SODIUM CHLORIDE 9 MG/ML
100 INJECTION, SOLUTION INTRAVENOUS CONTINUOUS
Status: CANCELLED | OUTPATIENT
Start: 2021-08-06

## 2021-08-06 RX ORDER — SODIUM CHLORIDE 0.9 % (FLUSH) 0.9 %
10 SYRINGE (ML) INJECTION AS NEEDED
Status: CANCELLED | OUTPATIENT
Start: 2021-08-06

## 2021-08-06 RX ORDER — LEVOFLOXACIN 5 MG/ML
500 INJECTION, SOLUTION INTRAVENOUS ONCE
Status: CANCELLED | OUTPATIENT
Start: 2021-08-06 | End: 2021-08-06

## 2021-08-06 RX ORDER — SODIUM CHLORIDE 0.9 % (FLUSH) 0.9 %
10 SYRINGE (ML) INJECTION EVERY 12 HOURS SCHEDULED
Status: CANCELLED | OUTPATIENT
Start: 2021-08-06

## 2021-08-06 NOTE — PROGRESS NOTES
Cystoscopy    Date/Time: 8/6/2021 2:16 PM  Performed by: Fadumo Silva MD  Authorized by: Lizbeth Houston APRN   Preparation: Patient was prepped and draped in the usual sterile fashion.  Local anesthesia used: no    Anesthesia:  Local anesthesia used: no    Sedation:  Patient sedated: no    Patient tolerance: patient tolerated the procedure well with no immediate complications  Comments: Cytoscopy Procedure:     Procedure: Flexible cytoscope was passed per urethra into the bladder without difficulty after proper consent. The bladder was inspected in a systematic meridian fashion. There were no tumors, lesions, stones, or other abnormalities noted within the bladder except for a very small area of erythema on the right lateral wall. Both ureteral orifices were identified and were normal in appearance. The flexible cytoscope was removed. The patient tolerated the procedure well.     FOLLOW UP OFFICE NOTE: The CT scan of the Abdomen/Pelvis was as follows:  CONCLUSION:   1. No acute abnormality in the abdomen or pelvis, specifically no acute abnormality of the kidneys   or renal collecting system identified.  Given gross hematuria consider cystoscopy follow-up.  2. Status post hysterectomy.  3. Sigmoid diverticulosis without diverticulitis.  4. Fatty liver.  5. Cyst at the left adnexa measuring 3.2 cm.

## 2021-08-06 NOTE — H&P
[facility]   Urology HISTORY AND PHYSICAL    Patient Name: Aubrie Sanon  : 1955  MRN: [id number]  Primary Care Physician:  Melani Bae MD  Date of admission: (Not on file)    Subjective   Subjective     Chief Complaint: Hematuria and bladder lesion    Patient had a work-up for hematuria was found to have a lesion on her bladder.  She presents for biopsy and fulguration of this lesion.      Personal History     Past Medical History:   Diagnosis Date   • Diabetes (CMS/HCC)        Past Surgical History:   Procedure Laterality Date   • BLADDER REPAIR     • HYSTERECTOMY     • KNEE SURGERY         Family History: family history includes Colon cancer in her paternal grandmother. Otherwise pertinent FHx was reviewed and not pertinent to current issue.    Social History:  reports that she has never smoked. She has never used smokeless tobacco. She reports that she does not drink alcohol and does not use drugs.    Home Medications:  Alogliptin Benzoate, Biotin, Cyanocobalamin, DULoxetine, Desvenlafaxine Succinate ER, Influenza Vac Typ A&B Surf Ant, Insulin Glargine (1 Unit Dial), Insulin NPH (Human) (Isophane), JOBST KNEE HIGH COMPRESSION SM, Liraglutide, PARoxetine, SAXagliptin, Vortioxetine HBr, acetaminophen-codeine, albuterol sulfate HFA, amoxicillin, aspirin, busPIRone, cefdinir, cholecalciferol, clindamycin, clonazePAM, colestipol, doxepin, empagliflozin, ferrous sulfate, fluticasone, hydrOXYzine, indomethacin, insulin degludec, insulin detemir, isosorbide mononitrate, lisinopril, meloxicam, metaxalone, metoprolol succinate XL, metoprolol tartrate, minocycline, montelukast, ondansetron, pantoprazole, predniSONE, promethazine, senna, and simvastatin    Allergies:  Allergies   Allergen Reactions   • Contrast Dye Itching   • Carisoprodol Hives       Objective    Objective     Vitals:   BP: (112)/(50) 112/50    Physical Exam  Constitutional:       Appearance: Normal appearance.   Cardiovascular:       Rate and Rhythm: Normal rate and regular rhythm.   Pulmonary:      Effort: Pulmonary effort is normal.      Breath sounds: Normal breath sounds.   Neurological:      Mental Status: She is alert. Mental status is at baseline.   Psychiatric:         Mood and Affect: Mood and affect normal.         Speech: Speech normal.         Judgment: Judgment normal.         Result Review    Result Review:  I have personally reviewed the results from the time of this admission to 8/6/2021 15:16 EDT and agree with these findings:  []  Laboratory  []  Microbiology  []  Radiology  []  EKG/Telemetry   []  Cardiology/Vascular   []  Pathology  []  Old records  []  Other:      Assessment/Plan   Assessment / Plan       Active Hospital Problems:  There are no active hospital problems to display for this patient.      Plan: Cystoscopy, bladder biopsy and fulguration.    Risks and benefits discussed with patient and they are agreeable to proceed.    DVT prophylaxis:  No DVT prophylaxis order currently exists.    CODE STATUS:           Electronically signed by Fadumo Silva MD, 08/06/21, 3:16 PM EDT.

## 2021-08-12 PROBLEM — R31.29 MICROSCOPIC HEMATURIA: Status: ACTIVE | Noted: 2021-08-12

## 2021-08-13 ENCOUNTER — PREP FOR SURGERY (OUTPATIENT)
Dept: OTHER | Facility: HOSPITAL | Age: 66
End: 2021-08-13

## 2021-08-13 NOTE — H&P
[facility]   Urology HISTORY AND PHYSICAL    Patient Name: Aubrie Sanon  : 1955  MRN: [id number]  Primary Care Physician:  Melani Bae MD  Date of admission: (Not on file)    Subjective   Subjective     Chief Complaint: Bladder lesion    Patient presents for CBF for bladder lesion.      Personal History     Past Medical History:   Diagnosis Date   • Anxiety    • Arthritis     OSTEOPOROSIS LEFT HIP   • Asthma    • COPD (chronic obstructive pulmonary disease) (CMS/HCC)     EMPHYSEMA- INHALERS PRN   • Depression    • Diabetes (CMS/HCC)     DOESN'T CHECK BG OFTEN AT HOME   • Heart murmur     ASYMPTOMATIC. DENIES CP/SOB, SEE'S PCP   • Hematuria CURRENTLY       Past Surgical History:   Procedure Laterality Date   • BLADDER REPAIR     • HYSTERECTOMY      PARTIAL HYST- RETROCELE, PROLAPSED UTERUS REPAIR   • JOINT REPLACEMENT      RIGHT TKR   • KNEE SURGERY Right     RIGHT TKR       Family History: family history includes Colon cancer in her paternal grandmother. Otherwise pertinent FHx was reviewed and not pertinent to current issue.    Social History:  reports that she has never smoked. She has never used smokeless tobacco. She reports that she does not drink alcohol and does not use drugs.    Home Medications:  Cyanocobalamin, DULoxetine, Influenza Vac Typ A&B Surf Ant, Insulin NPH (Human) (Isophane), albuterol sulfate HFA, aspirin, cholecalciferol, clonazePAM, doxepin, empagliflozin, insulin NPH, isosorbide mononitrate, lisinopril, metaxalone, metoprolol succinate XL, montelukast, and simvastatin    Allergies:  Allergies   Allergen Reactions   • Carisoprodol Hives   • Contrast Dye Itching       Objective    Objective     Vitals:        Physical Exam  Constitutional:       Appearance: Normal appearance.   Cardiovascular:      Rate and Rhythm: Normal rate and regular rhythm.   Pulmonary:      Effort: Pulmonary effort is normal.      Breath sounds: Normal breath sounds.   Neurological:      Mental  Status: She is alert. Mental status is at baseline.   Psychiatric:         Mood and Affect: Mood and affect normal.         Speech: Speech normal.         Judgment: Judgment normal.         Result Review    Result Review:  I have personally reviewed the results from the time of this admission to 8/13/2021 14:15 EDT and agree with these findings:  []  Laboratory  []  Microbiology  []  Radiology  []  EKG/Telemetry   []  Cardiology/Vascular   []  Pathology  []  Old records  []  Other:      Assessment/Plan   Assessment / Plan       Active Hospital Problems:  There are no active hospital problems to display for this patient.      Plan: Cystoscopy, bladder biopsy and fulguration.    Risks and benefits discussed with patient and they are agreeable to proceed.    DVT prophylaxis:  No DVT prophylaxis order currently exists.    CODE STATUS:           Electronically signed by Fadumo Silva MD, 08/13/21, 2:15 PM EDT.

## 2021-08-18 DIAGNOSIS — Z96.651 S/P TOTAL KNEE ARTHROPLASTY, RIGHT: Primary | ICD-10-CM

## 2021-08-18 DIAGNOSIS — Z96.651 HISTORY OF TOTAL KNEE ARTHROPLASTY, RIGHT: ICD-10-CM

## 2021-09-07 ENCOUNTER — TELEPHONE (OUTPATIENT)
Dept: UROLOGY | Facility: CLINIC | Age: 66
End: 2021-09-07

## 2021-09-07 NOTE — TELEPHONE ENCOUNTER
Pt called and left mess with . Pt was scheduled for a bladder bx and cancelled because she got COVID. She has now been cleared so she wants to rescheduled bx.

## 2021-09-09 NOTE — TELEPHONE ENCOUNTER
Patient called. She finished quarantine.  She has been vaccinated for Covid.  She isn't sure if the biopsy was considered elective.  She said that she would rather schedule it again after Covid had  down some, it there is no issue.

## 2021-09-10 NOTE — TELEPHONE ENCOUNTER
Spoke to patient and notified her that we will call back at a later date to reschedule her procedure once [facility] COVID restrictions have been lifted. She is okay with this.

## 2021-10-26 RX ORDER — DOXEPIN HYDROCHLORIDE 50 MG/1
50 CAPSULE ORAL NIGHTLY
COMMUNITY

## 2021-10-26 RX ORDER — ISOSORBIDE MONONITRATE 30 MG/1
30 TABLET, EXTENDED RELEASE ORAL DAILY
COMMUNITY

## 2021-10-26 RX ORDER — METOPROLOL TARTRATE 50 MG/1
50 TABLET, FILM COATED ORAL DAILY
COMMUNITY
End: 2021-11-10 | Stop reason: SDUPTHER

## 2021-11-02 ENCOUNTER — HOSPITAL ENCOUNTER (OUTPATIENT)
Facility: HOSPITAL | Age: 66
Setting detail: HOSPITAL OUTPATIENT SURGERY
Discharge: HOME | End: 2021-11-02
Attending: UROLOGY | Admitting: UROLOGY

## 2021-11-02 ENCOUNTER — ANESTHESIA (OUTPATIENT)
Dept: PERIOP | Facility: HOSPITAL | Age: 66
End: 2021-11-02

## 2021-11-02 ENCOUNTER — ANESTHESIA EVENT (OUTPATIENT)
Dept: PERIOP | Facility: HOSPITAL | Age: 66
End: 2021-11-02

## 2021-11-02 VITALS
HEART RATE: 66 BPM | SYSTOLIC BLOOD PRESSURE: 122 MMHG | WEIGHT: 218.92 LBS | HEIGHT: 63 IN | OXYGEN SATURATION: 98 % | BODY MASS INDEX: 38.79 KG/M2 | TEMPERATURE: 97.6 F | DIASTOLIC BLOOD PRESSURE: 60 MMHG | RESPIRATION RATE: 16 BRPM

## 2021-11-02 DIAGNOSIS — N32.9 LESION OF BLADDER: ICD-10-CM

## 2021-11-02 DIAGNOSIS — R31.29 MICROSCOPIC HEMATURIA: ICD-10-CM

## 2021-11-02 LAB
GLUCOSE BLDC GLUCOMTR-MCNC: 186 MG/DL (ref 70–99)
GLUCOSE BLDC GLUCOMTR-MCNC: 187 MG/DL (ref 70–99)

## 2021-11-02 PROCEDURE — 25010000002 FENTANYL CITRATE (PF) 50 MCG/ML SOLUTION: Performed by: NURSE ANESTHETIST, CERTIFIED REGISTERED

## 2021-11-02 PROCEDURE — 25010000002 ONDANSETRON PER 1 MG: Performed by: NURSE ANESTHETIST, CERTIFIED REGISTERED

## 2021-11-02 PROCEDURE — 25010000002 PROPOFOL 10 MG/ML EMULSION: Performed by: NURSE ANESTHETIST, CERTIFIED REGISTERED

## 2021-11-02 PROCEDURE — 88305 TISSUE EXAM BY PATHOLOGIST: CPT | Performed by: UROLOGY

## 2021-11-02 PROCEDURE — 25010000002 KETOROLAC TROMETHAMINE PER 15 MG: Performed by: NURSE ANESTHETIST, CERTIFIED REGISTERED

## 2021-11-02 PROCEDURE — 25010000002 DEXAMETHASONE PER 1 MG: Performed by: NURSE ANESTHETIST, CERTIFIED REGISTERED

## 2021-11-02 PROCEDURE — 25010000002 LEVOFLOXACIN PER 250 MG: Performed by: UROLOGY

## 2021-11-02 PROCEDURE — 82962 GLUCOSE BLOOD TEST: CPT

## 2021-11-02 PROCEDURE — 52204 CYSTOSCOPY W/BIOPSY(S): CPT | Performed by: UROLOGY

## 2021-11-02 PROCEDURE — 25010000002 MIDAZOLAM PER 1MG: Performed by: ANESTHESIOLOGY

## 2021-11-02 RX ORDER — OXYCODONE HYDROCHLORIDE 5 MG/1
5 TABLET ORAL
Status: DISCONTINUED | OUTPATIENT
Start: 2021-11-02 | End: 2021-11-02 | Stop reason: HOSPADM

## 2021-11-02 RX ORDER — ONDANSETRON 4 MG/1
4 TABLET, FILM COATED ORAL ONCE AS NEEDED
Status: DISCONTINUED | OUTPATIENT
Start: 2021-11-02 | End: 2021-11-02 | Stop reason: HOSPADM

## 2021-11-02 RX ORDER — IBUPROFEN 600 MG/1
600 TABLET ORAL EVERY 6 HOURS PRN
Status: DISCONTINUED | OUTPATIENT
Start: 2021-11-02 | End: 2021-11-02 | Stop reason: HOSPADM

## 2021-11-02 RX ORDER — DEXAMETHASONE SODIUM PHOSPHATE 4 MG/ML
INJECTION, SOLUTION INTRA-ARTICULAR; INTRALESIONAL; INTRAMUSCULAR; INTRAVENOUS; SOFT TISSUE AS NEEDED
Status: DISCONTINUED | OUTPATIENT
Start: 2021-11-02 | End: 2021-11-02 | Stop reason: SURG

## 2021-11-02 RX ORDER — SODIUM CHLORIDE 9 MG/ML
100 INJECTION, SOLUTION INTRAVENOUS CONTINUOUS
Status: DISCONTINUED | OUTPATIENT
Start: 2021-11-02 | End: 2021-11-02 | Stop reason: HOSPADM

## 2021-11-02 RX ORDER — FENTANYL CITRATE 50 UG/ML
INJECTION, SOLUTION INTRAMUSCULAR; INTRAVENOUS AS NEEDED
Status: DISCONTINUED | OUTPATIENT
Start: 2021-11-02 | End: 2021-11-02 | Stop reason: SURG

## 2021-11-02 RX ORDER — PROMETHAZINE HYDROCHLORIDE 25 MG/1
25 SUPPOSITORY RECTAL ONCE AS NEEDED
Status: DISCONTINUED | OUTPATIENT
Start: 2021-11-02 | End: 2021-11-02 | Stop reason: HOSPADM

## 2021-11-02 RX ORDER — SODIUM CHLORIDE 0.9 % (FLUSH) 0.9 %
10 SYRINGE (ML) INJECTION AS NEEDED
Status: DISCONTINUED | OUTPATIENT
Start: 2021-11-02 | End: 2021-11-02 | Stop reason: HOSPADM

## 2021-11-02 RX ORDER — SODIUM CHLORIDE 0.9 % (FLUSH) 0.9 %
10 SYRINGE (ML) INJECTION EVERY 12 HOURS SCHEDULED
Status: DISCONTINUED | OUTPATIENT
Start: 2021-11-02 | End: 2021-11-02 | Stop reason: HOSPADM

## 2021-11-02 RX ORDER — LEVOFLOXACIN 5 MG/ML
500 INJECTION, SOLUTION INTRAVENOUS ONCE
Status: COMPLETED | OUTPATIENT
Start: 2021-11-02 | End: 2021-11-02

## 2021-11-02 RX ORDER — PROMETHAZINE HYDROCHLORIDE 12.5 MG/1
12.5 TABLET ORAL ONCE AS NEEDED
Status: DISCONTINUED | OUTPATIENT
Start: 2021-11-02 | End: 2021-11-02 | Stop reason: HOSPADM

## 2021-11-02 RX ORDER — MIDAZOLAM HYDROCHLORIDE 2 MG/2ML
2 INJECTION, SOLUTION INTRAMUSCULAR; INTRAVENOUS ONCE
Status: COMPLETED | OUTPATIENT
Start: 2021-11-02 | End: 2021-11-02

## 2021-11-02 RX ORDER — ONDANSETRON 2 MG/ML
4 INJECTION INTRAMUSCULAR; INTRAVENOUS ONCE AS NEEDED
Status: DISCONTINUED | OUTPATIENT
Start: 2021-11-02 | End: 2021-11-02 | Stop reason: HOSPADM

## 2021-11-02 RX ORDER — KETOROLAC TROMETHAMINE 30 MG/ML
INJECTION, SOLUTION INTRAMUSCULAR; INTRAVENOUS AS NEEDED
Status: DISCONTINUED | OUTPATIENT
Start: 2021-11-02 | End: 2021-11-02 | Stop reason: SURG

## 2021-11-02 RX ORDER — SODIUM CHLORIDE, SODIUM LACTATE, POTASSIUM CHLORIDE, CALCIUM CHLORIDE 600; 310; 30; 20 MG/100ML; MG/100ML; MG/100ML; MG/100ML
9 INJECTION, SOLUTION INTRAVENOUS CONTINUOUS PRN
Status: DISCONTINUED | OUTPATIENT
Start: 2021-11-02 | End: 2021-11-02 | Stop reason: HOSPADM

## 2021-11-02 RX ORDER — MEPERIDINE HYDROCHLORIDE 25 MG/ML
12.5 INJECTION INTRAMUSCULAR; INTRAVENOUS; SUBCUTANEOUS
Status: DISCONTINUED | OUTPATIENT
Start: 2021-11-02 | End: 2021-11-02 | Stop reason: HOSPADM

## 2021-11-02 RX ORDER — PROPOFOL 10 MG/ML
VIAL (ML) INTRAVENOUS AS NEEDED
Status: DISCONTINUED | OUTPATIENT
Start: 2021-11-02 | End: 2021-11-02 | Stop reason: SURG

## 2021-11-02 RX ORDER — ONDANSETRON 2 MG/ML
INJECTION INTRAMUSCULAR; INTRAVENOUS AS NEEDED
Status: DISCONTINUED | OUTPATIENT
Start: 2021-11-02 | End: 2021-11-02 | Stop reason: SURG

## 2021-11-02 RX ORDER — LIDOCAINE HYDROCHLORIDE 20 MG/ML
INJECTION, SOLUTION INFILTRATION; PERINEURAL AS NEEDED
Status: DISCONTINUED | OUTPATIENT
Start: 2021-11-02 | End: 2021-11-02 | Stop reason: SURG

## 2021-11-02 RX ORDER — PROMETHAZINE HYDROCHLORIDE 12.5 MG/1
25 TABLET ORAL ONCE AS NEEDED
Status: DISCONTINUED | OUTPATIENT
Start: 2021-11-02 | End: 2021-11-02 | Stop reason: HOSPADM

## 2021-11-02 RX ORDER — MAGNESIUM HYDROXIDE 1200 MG/15ML
LIQUID ORAL AS NEEDED
Status: DISCONTINUED | OUTPATIENT
Start: 2021-11-02 | End: 2021-11-02 | Stop reason: HOSPADM

## 2021-11-02 RX ORDER — ACETAMINOPHEN 325 MG/1
650 TABLET ORAL ONCE
Status: DISCONTINUED | OUTPATIENT
Start: 2021-11-02 | End: 2021-11-02 | Stop reason: HOSPADM

## 2021-11-02 RX ORDER — ACETAMINOPHEN 500 MG
1000 TABLET ORAL ONCE
Status: DISCONTINUED | OUTPATIENT
Start: 2021-11-02 | End: 2021-11-02 | Stop reason: HOSPADM

## 2021-11-02 RX ADMIN — OXYCODONE HYDROCHLORIDE 5 MG: 5 TABLET ORAL at 09:51

## 2021-11-02 RX ADMIN — ONDANSETRON 4 MG: 2 INJECTION INTRAMUSCULAR; INTRAVENOUS at 09:10

## 2021-11-02 RX ADMIN — MIDAZOLAM HYDROCHLORIDE 2 MG: 1 INJECTION, SOLUTION INTRAMUSCULAR; INTRAVENOUS at 08:33

## 2021-11-02 RX ADMIN — DEXAMETHASONE SODIUM PHOSPHATE 4 MG: 4 INJECTION INTRA-ARTICULAR; INTRALESIONAL; INTRAMUSCULAR; INTRAVENOUS; SOFT TISSUE at 09:10

## 2021-11-02 RX ADMIN — LIDOCAINE HYDROCHLORIDE 100 MG: 20 INJECTION, SOLUTION INFILTRATION; PERINEURAL at 08:53

## 2021-11-02 RX ADMIN — LEVOFLOXACIN 500 MG: 5 INJECTION, SOLUTION INTRAVENOUS at 08:54

## 2021-11-02 RX ADMIN — PROPOFOL 200 MG: 10 INJECTION, EMULSION INTRAVENOUS at 08:53

## 2021-11-02 RX ADMIN — FENTANYL CITRATE 100 MCG: 50 INJECTION, SOLUTION INTRAMUSCULAR; INTRAVENOUS at 08:53

## 2021-11-02 RX ADMIN — SODIUM CHLORIDE, POTASSIUM CHLORIDE, SODIUM LACTATE AND CALCIUM CHLORIDE 9 ML/HR: 600; 310; 30; 20 INJECTION, SOLUTION INTRAVENOUS at 07:56

## 2021-11-02 RX ADMIN — KETOROLAC TROMETHAMINE 30 MG: 30 INJECTION, SOLUTION INTRAMUSCULAR; INTRAVENOUS at 09:23

## 2021-11-02 NOTE — ANESTHESIA POSTPROCEDURE EVALUATION
Patient: Aubrie Sanon    Procedure Summary     Date: 11/02/21 Room / Location: [facility] OR 06 / [facility] MAIN OR    Anesthesia Start: 0853 Anesthesia Stop: 0928    Procedure: CYSTOSCOPY BLADDER BIOPSY WITH FULGURATION (N/A ) Diagnosis:       Microscopic hematuria      Lesion of bladder      (Microscopic hematuria [R31.29])      (Lesion of bladder [N32.9])    Surgeons: Fadumo Silva MD Provider: Dewayne Hanna MD    Anesthesia Type: general ASA Status: 3          Anesthesia Type: general    Vitals  Vitals Value Taken Time   /60 11/02/21 0927   Temp     Pulse 68 11/02/21 0929   Resp     SpO2 100 % 11/02/21 0929   Vitals shown include unvalidated device data.        Post Anesthesia Care and Evaluation    Patient location during evaluation: bedside  Patient participation: complete - patient participated  Level of consciousness: awake  Pain score: 0  Pain management: adequate  Airway patency: patent  Anesthetic complications: No anesthetic complications  PONV Status: none  Cardiovascular status: acceptable and stable  Respiratory status: acceptable and room air  Hydration status: acceptable    Comments: An Anesthesiologist personally participated in the most demanding procedures (including induction and emergence if applicable) in the anesthesia plan, monitored the course of anesthesia administration at frequent intervals and remained physically present and available for immediate diagnosis and treatment of emergencies.

## 2021-11-02 NOTE — ANESTHESIA PREPROCEDURE EVALUATION
Anesthesia Evaluation     Patient summary reviewed and Nursing notes reviewed   no history of anesthetic complications:  NPO Solid Status: > 8 hours  NPO Liquid Status: > 2 hours           Airway   Mallampati: III  TM distance: <3 FB  Neck ROM: full  No difficulty expected  Dental      Pulmonary - normal exam    breath sounds clear to auscultation  (+) COPD mild, asthma,  Cardiovascular - normal exam  Exercise tolerance: good (4-7 METS)    Rhythm: regular    (+) valvular problems/murmurs,       Neuro/Psych  (+) numbness, psychiatric history,     GI/Hepatic/Renal/Endo    (+)  GERD,  diabetes mellitus, thyroid problem     Musculoskeletal     Abdominal    Substance History - negative use     OB/GYN negative ob/gyn ROS         Other   arthritis,                      Anesthesia Plan    ASA 3     general   (Patient understands anesthesia not responsible for dental damage.)  intravenous induction     Anesthetic plan, all risks, benefits, and alternatives have been provided, discussed and informed consent has been obtained with: patient.    Plan discussed with CRNA.

## 2021-11-02 NOTE — OP NOTE
CYSTOSCOPY BLADDER BIOPSY  Procedure Report    Patient Name:  Aubrie Sanon  YOB: 1955    Date of Surgery:  11/2/2021     Pre-op Diagnosis:   Microscopic hematuria [R31.29]  Lesion of bladder [N32.9]       Post-Op Diagnosis Codes:     * Microscopic hematuria [R31.29]     * Lesion of bladder [N32.9]    Procedure/CPT® Codes:      Procedure(s):  CYSTOSCOPY BLADDER BIOPSY WITH FULGURATION    Staff:  Surgeon(s):  Fadumo Silva MD         Anesthesia: Choice    Estimated Blood Loss: none    Implants:    Nothing was implanted during the procedure    Specimen:          Specimens     ID Source Type Tests Collected By Collected At Frozen?    A Urinary Bladder Tissue · TISSUE PATHOLOGY EXAM   Fadumo Silva MD 11/2/21 6054     Description: BLADDER BIOPSY x 4    This specimen was not marked as sent.              Complications: None    Description of Procedure: After proper consent was obtained, patient was taken to operating room and MAC anesthesia was performed.  The patient was placed in the dorsal lithotomy position and prepped and draped in the normal sterile fashion for cystoscopy.       A 22 [place] rigid cystoscope was inserted into the bladder.  The bladder was inspected in a systemic meridian fashion using a 30 degree lens.  There were no tumors, lesions, stones or other abnormalities seen except for some erythematous areas on the right lateral wall.  Both ureteral orifices were normal in appearance.       Biopsies were taken of the suspicious areas using biopsy forceps.  The areas were then fulgerated with a bugbee.  There areas were less than 0.5cm in size.       The bladder was emptied and the cystoscope removed.       The patient tolerated the procedure well and was transferred to the PACU in stable condition.         Fadumo Silva MD     Date: 11/2/2021  Time: 09:27 EDT

## 2021-11-02 NOTE — H&P
[facility]   Urology HISTORY AND PHYSICAL    Patient Name: Aubrie Sanon  : 1955  MRN: [id number]  Primary Care Physician:  Melani Bae MD  Date of admission: 2021    Subjective   Subjective     Chief Complaint: Hematuria and bladder lesion    Patient had a work-up for hematuria was found to have a lesion on her bladder.  She presents for biopsy and fulguration of this lesion.      Personal History     Past Medical History:   Diagnosis Date   • Anxiety    • Arthritis     OSTEOPOROSIS LEFT HIP   • Asthma    • COPD (chronic obstructive pulmonary disease) ([facility])     EMPHYSEMA- INHALERS PRN   • Depression    • Diabetes (HCC)     DOESN'T CHECK BG OFTEN AT HOME   • Heart murmur     ASYMPTOMATIC. DENIES CP/SOB, SEE'S PCP   • Hematuria CURRENTLY       Past Surgical History:   Procedure Laterality Date   • BLADDER REPAIR     • CYSTOSCOPY     • HYSTERECTOMY      PARTIAL HYST- RETROCELE, PROLAPSED UTERUS REPAIR   • JOINT REPLACEMENT      RIGHT TKR   • KNEE SURGERY Right     RIGHT TKR       Family History: family history includes Colon cancer in her paternal grandmother. Otherwise pertinent FHx was reviewed and not pertinent to current issue.    Social History:  reports that she has never smoked. She has never used smokeless tobacco. She reports that she does not drink alcohol and does not use drugs.    Home Medications:  Cyanocobalamin, albuterol sulfate HFA, aspirin, cholecalciferol, clonazePAM, doxepin, empagliflozin, insulin aspart, isosorbide mononitrate, lisinopril, metaxalone, metoprolol tartrate, montelukast, and simvastatin    Allergies:  Allergies   Allergen Reactions   • Carisoprodol Hives   • Contrast Dye Itching       Objective    Objective     Vitals:   Temp:  [97.1 °F (36.2 °C)] 97.1 °F (36.2 °C)  Heart Rate:  [72] 72  Resp:  [18] 18  BP: (134)/(63) 134/63    Physical Exam  Constitutional:       Appearance: Normal appearance.   Cardiovascular:      Rate and Rhythm: Normal rate and regular  rhythm.   Pulmonary:      Effort: Pulmonary effort is normal.      Breath sounds: Normal breath sounds.   Neurological:      Mental Status: She is alert. Mental status is at baseline.   Psychiatric:         Mood and Affect: Mood and affect normal.         Speech: Speech normal.         Judgment: Judgment normal.         Result Review    Result Review:  I have personally reviewed the results from the time of this admission to 11/2/2021 07:30 EDT and agree with these findings:  []  Laboratory  []  Microbiology  []  Radiology  []  EKG/Telemetry   []  Cardiology/Vascular   []  Pathology  []  Old records  []  Other:      Assessment/Plan   Assessment / Plan       Active Hospital Problems:  Active Hospital Problems    Diagnosis    • Microscopic hematuria      Added automatically from request for surgery [id number]     • Lesion of bladder        Plan: Cystoscopy, bladder biopsy and fulguration.    Risks and benefits discussed with patient and they are agreeable to proceed.    DVT prophylaxis:  No DVT prophylaxis order currently exists.    CODE STATUS:

## 2021-11-03 LAB
CYTO UR: NORMAL
LAB AP CASE REPORT: NORMAL
LAB AP CLINICAL INFORMATION: NORMAL
PATH REPORT.FINAL DX SPEC: NORMAL
PATH REPORT.GROSS SPEC: NORMAL

## 2021-11-10 ENCOUNTER — OFFICE VISIT (OUTPATIENT)
Dept: UROLOGY | Facility: CLINIC | Age: 66
End: 2021-11-10

## 2021-11-10 VITALS
SYSTOLIC BLOOD PRESSURE: 125 MMHG | HEIGHT: 63 IN | WEIGHT: 218.6 LBS | DIASTOLIC BLOOD PRESSURE: 65 MMHG | BODY MASS INDEX: 38.73 KG/M2

## 2021-11-10 DIAGNOSIS — R31.29 MICROSCOPIC HEMATURIA: Primary | ICD-10-CM

## 2021-11-10 LAB
BILIRUB BLD-MCNC: NEGATIVE MG/DL
CLARITY, POC: CLEAR
COLOR UR: YELLOW
EXPIRATION DATE: ABNORMAL
GLUCOSE UR STRIP-MCNC: ABNORMAL MG/DL
KETONES UR QL: NEGATIVE
LEUKOCYTE EST, POC: NEGATIVE
Lab: ABNORMAL
NITRITE UR-MCNC: NEGATIVE MG/ML
PH UR: 5 [PH] (ref 5–8)
PROT UR STRIP-MCNC: NEGATIVE MG/DL
RBC # UR STRIP: ABNORMAL /UL
SP GR UR: 1.01 (ref 1–1.03)
UROBILINOGEN UR QL: NORMAL

## 2021-11-10 PROCEDURE — 99212 OFFICE O/P EST SF 10 MIN: CPT | Performed by: UROLOGY

## 2021-11-10 PROCEDURE — 81003 URINALYSIS AUTO W/O SCOPE: CPT | Performed by: UROLOGY

## 2021-11-10 RX ORDER — MOMETASONE FUROATE 50 UG/1
1 SPRAY, METERED NASAL AS NEEDED
COMMUNITY
Start: 2021-11-09

## 2021-11-10 RX ORDER — METOPROLOL SUCCINATE 25 MG/1
25 TABLET, EXTENDED RELEASE ORAL NIGHTLY
COMMUNITY
Start: 2021-10-27

## 2021-11-10 NOTE — PROGRESS NOTES
"Chief Complaint  Post-op Follow-up ([facility])    Subjective          Aubrie Sanon presents to [facility]  History of Present Illness    Ms. Sanon is a follow-up after undergoing a cystoscopy, bladder biopsy and fulguration, last week on 11/02/2021. She had a few erythematous areas on the right lateral wall which I biopsied. Her pathology revealed a chronic cystitis, but no bladder cancer was seen. These areas were found during a work-up for hematuria. Her CT scan revealed no acute abnormality of the kidneys or renal collecting system, but she had had a history of gross hematuria. The only thing found on the cystoscopy where the two areas mentioned, which again ended up being chronic cystitis. The patient had been an intermittent smoker for several years, but had not never smoked daily. She also has had a urethral sling twice.     The patient reports that she has been having some pain. She has been taking Tylenol to manage the pain. She is also concerned as she needs to empty her bowels immediately after eating.     Objective   Vital Signs:   /65   Ht 160 cm (63\")   Wt 99.2 kg (218 lb 9.6 oz)   BMI 38.72 kg/m²     Physical Exam  Vitals and nursing note reviewed.   Constitutional:       Appearance: Normal appearance. She is well-developed.   Pulmonary:      Effort: Pulmonary effort is normal.      Breath sounds: Normal air entry.   Neurological:      Mental Status: She is alert and oriented to person, place, and time.      Motor: Motor function is intact.   Psychiatric:         Mood and Affect: Mood normal.         Behavior: Behavior normal.        Result Review :                  Results for orders placed or performed in visit on 11/10/21   POC Urinalysis Dipstick, Automated    Specimen: Urine   Result Value Ref Range    Color Yellow Yellow, Straw, Dark Yellow, Connie    Clarity, UA Clear Clear    Specific Gravity  1.015 1.005 - 1.030    pH, Urine 5.0 5.0 - 8.0    Leukocytes " Negative Negative    Nitrite, UA Negative Negative    Protein, POC Negative Negative mg/dL    Glucose, UA >=1000 mg/dL (3+) (A) Negative, 1000 mg/dL (3+) mg/dL    Ketones, UA Negative Negative    Urobilinogen, UA Normal Normal    Bilirubin Negative Negative    Blood, UA Moderate (A) Negative    Lot Number [id number]     Expiration Date 09/30/2022        Assessment and Plan    Diagnoses and all orders for this visit:    1. Microscopic hematuria (Primary)  -     POC Urinalysis Dipstick, Automated  - Her tests all came back clear. Her workup for hematuria is negative. She will follow-up with me in 1 year or sooner if she sees blood in her urine.         Follow Up   Return in about 1 year (around 11/10/2022) for Annual Visit.  Patient was given instructions and counseling regarding her condition or for health maintenance advice. Please see specific information pulled into the AVS if appropriate.     Transcribed from ambient dictation for Fadumo Silva MD by Cydney Hutton.  11/10/21   13:02 EST    Patient verbalized consent to the visit recording.  I have personally performed the services described in this document as transcribed by the above individual, and it is both accurate and complete.  Fadumo Silva MD  11/10/2021  13:21 EST

## 2022-02-15 ENCOUNTER — OFFICE VISIT (OUTPATIENT)
Dept: GASTROENTEROLOGY | Facility: CLINIC | Age: 67
End: 2022-02-15

## 2022-02-15 ENCOUNTER — PREP FOR SURGERY (OUTPATIENT)
Dept: OTHER | Facility: HOSPITAL | Age: 67
End: 2022-02-15

## 2022-02-15 VITALS
HEART RATE: 71 BPM | WEIGHT: 225.6 LBS | HEIGHT: 63 IN | BODY MASS INDEX: 39.97 KG/M2 | DIASTOLIC BLOOD PRESSURE: 50 MMHG | SYSTOLIC BLOOD PRESSURE: 110 MMHG

## 2022-02-15 DIAGNOSIS — K76.0 FATTY LIVER: ICD-10-CM

## 2022-02-15 DIAGNOSIS — K62.5 RECTAL BLEEDING: ICD-10-CM

## 2022-02-15 DIAGNOSIS — R19.7 DIARRHEA, UNSPECIFIED TYPE: Primary | ICD-10-CM

## 2022-02-15 DIAGNOSIS — R15.2 INCONTINENCE OF FECES WITH FECAL URGENCY: ICD-10-CM

## 2022-02-15 DIAGNOSIS — R15.9 INCONTINENCE OF FECES WITH FECAL URGENCY: ICD-10-CM

## 2022-02-15 PROCEDURE — 99214 OFFICE O/P EST MOD 30 MIN: CPT | Performed by: NURSE PRACTITIONER

## 2022-02-15 RX ORDER — DENOSUMAB 60 MG/ML
60 INJECTION SUBCUTANEOUS ONCE
COMMUNITY

## 2022-02-15 RX ORDER — BUPROPION HYDROCHLORIDE 150 MG/1
150 TABLET ORAL EVERY MORNING
COMMUNITY
Start: 2022-01-25

## 2022-02-15 RX ORDER — POLYETHYLENE GLYCOL 3350, SODIUM SULFATE ANHYDROUS, SODIUM BICARBONATE, SODIUM CHLORIDE, POTASSIUM CHLORIDE 227.1; 21.5; 6.36; 5.53; .754 G/L; G/L; G/L; G/L; G/L
4 POWDER, FOR SOLUTION ORAL DAILY
Qty: 1 EACH | Refills: 0 | Status: SHIPPED | OUTPATIENT
Start: 2022-02-15 | End: 2022-02-16

## 2022-02-15 RX ORDER — ANTIARTHRITIC COMBINATION NO.2 900 MG
1 TABLET ORAL DAILY
COMMUNITY

## 2022-02-15 NOTE — PATIENT INSTRUCTIONS
Fatty Liver Disease    Fatty liver disease occurs when too much fat has built up in your liver cells. Fatty liver disease is also called hepatic steatosis or steatohepatitis. The liver removes harmful substances from your bloodstream and produces fluids that your body needs. It also helps your body use and store energy from the food you eat.  In many cases, fatty liver disease does not cause symptoms or problems. It is often diagnosed when tests are being done for other reasons. However, over time, fatty liver can cause inflammation that may lead to more serious liver problems, such as scarring of the liver (cirrhosis) and liver failure.  Fatty liver is associated with insulin resistance, increased body fat, high blood pressure (hypertension), and high cholesterol. These are features of metabolic syndrome and increase your risk for stroke, diabetes, and heart disease.  What are the causes?  This condition may be caused by:  · Drinking too much alcohol.  · Poor nutrition.  · Obesity.  · Cushing's syndrome.  · Diabetes.  · High cholesterol.  · Certain drugs.  · Poisons.  · Some viral infections.  · Pregnancy.  What increases the risk?  You are more likely to develop this condition if you:  · Abuse alcohol.  · Are overweight.  · Have diabetes.  · Have hepatitis.  · Have a high triglyceride level.  · Are pregnant.  What are the signs or symptoms?  Fatty liver disease often does not cause symptoms. If symptoms do develop, they can include:  · Fatigue.  · Weakness.  · Weight loss.  · Confusion.  · Abdominal pain.  · Nausea and vomiting.  · Yellowing of your skin and the white parts of your eyes (jaundice).  · Itchy skin.  How is this diagnosed?  This condition may be diagnosed by:  · A physical exam and medical history.  · Blood tests.  · Imaging tests, such as an ultrasound, CT scan, or MRI.  · A liver biopsy. A small sample of liver tissue is removed using a needle. The sample is then looked at under a microscope.  How  is this treated?  Fatty liver disease is often caused by other health conditions. Treatment for fatty liver may involve medicines and lifestyle changes to manage conditions such as:  · Alcoholism.  · High cholesterol.  · Diabetes.  · Being overweight or obese.  Follow these instructions at home:    · Do not drink alcohol. If you have trouble quitting, ask your health care provider how to safely quit with the help of medicine or a supervised program. This is important to keep your condition from getting worse.  · Eat a healthy diet as told by your health care provider. Ask your health care provider about working with a diet and nutrition specialist (dietitian) to develop an eating plan.  · Exercise regularly. This can help you lose weight and control your cholesterol and diabetes. Talk to your health care provider about an exercise plan and which activities are best for you.  · Take over-the-counter and prescription medicines only as told by your health care provider.  · Keep all follow-up visits as told by your health care provider. This is important.  Contact a health care provider if:  You have trouble controlling your:  · Blood sugar. This is especially important if you have diabetes.  · Cholesterol.  · Drinking of alcohol.  Get help right away if:  · You have abdominal pain.  · You have jaundice.  · You have nausea and vomiting.  · You vomit blood or material that looks like coffee grounds.  · You have stools that are black, tar-like, or bloody.  Summary  · Fatty liver disease develops when too much fat builds up in the cells of your liver.  · Fatty liver disease often causes no symptoms or problems. However, over time, fatty liver can cause inflammation that may lead to more serious liver problems, such as scarring of the liver (cirrhosis).  · You are more likely to develop this condition if you abuse alcohol, are pregnant, are overweight, have diabetes, have hepatitis, or have high triglyceride  levels.  · Contact your health care provider if you have trouble controlling your weight, blood sugar, cholesterol, or drinking of alcohol.  This information is not intended to replace advice given to you by your health care provider. Make sure you discuss any questions you have with your health care provider.  Document Revised: 11/30/2018 Document Reviewed: 09/26/2018  [employer] Patient Education © 2021 [employer]

## 2022-02-21 ENCOUNTER — TELEPHONE (OUTPATIENT)
Dept: GASTROENTEROLOGY | Facility: CLINIC | Age: 67
End: 2022-02-21

## 2022-02-21 NOTE — TELEPHONE ENCOUNTER
Called pt. To talk to her about overdue labs and stool studies. Pt. Said she was unaware of the labs but she will have them done with she takes the stool sample to the [place] office.

## 2022-02-23 ENCOUNTER — PATIENT ROUNDING (BHMG ONLY) (OUTPATIENT)
Dept: GASTROENTEROLOGY | Facility: CLINIC | Age: 67
End: 2022-02-23

## 2022-02-23 NOTE — PROGRESS NOTES
February 23, 2022    Hello, may I speak with Aubrie Sanon?    My name is Marichuy SMITH     I am  with [facility]  [address]  [place] [address] [phone].    Before we get started may I verify your date of birth? 1955    I am calling to officially welcome you to our practice and ask about your recent visit. Is this a good time to talk? Yes     Tell me about your visit with us. What things went well?  Everything went well      We're always looking for ways to make our patients' experiences even better. Do you have recommendations on ways we may improve?  No     Overall were you satisfied with your first visit to our practice? Yes      I appreciate you taking the time to speak with me today. Is there anything else I can do for you? No      Thank you, and have a great day.

## 2022-02-25 ENCOUNTER — LAB (OUTPATIENT)
Dept: LAB | Facility: HOSPITAL | Age: 67
End: 2022-02-25

## 2022-02-25 LAB
027 TOXIN: NORMAL
C DIFF TOX GENS STL QL NAA+PROBE: NEGATIVE
DEPRECATED RDW RBC AUTO: 40.4 FL (ref 37–54)
ERYTHROCYTE [DISTWIDTH] IN BLOOD BY AUTOMATED COUNT: 12.3 % (ref 12.3–15.4)
HCT VFR BLD AUTO: 44.5 % (ref 34–46.6)
HEMOCCULT STL QL IA: NEGATIVE
HGB BLD-MCNC: 15.2 G/DL (ref 12–15.9)
INR PPP: 1.03 (ref 2–3)
LACTOFERRIN STL QL LA: NEGATIVE
MCH RBC QN AUTO: 30.4 PG (ref 26.6–33)
MCHC RBC AUTO-ENTMCNC: 34.2 G/DL (ref 31.5–35.7)
MCV RBC AUTO: 89 FL (ref 79–97)
PLATELET # BLD AUTO: 299 10*3/MM3 (ref 140–450)
PMV BLD AUTO: 9.5 FL (ref 6–12)
PROTHROMBIN TIME: 10.8 SECONDS (ref 9.4–12)
RBC # BLD AUTO: 5 10*6/MM3 (ref 3.77–5.28)
WBC NRBC COR # BLD: 6.45 10*3/MM3 (ref 3.4–10.8)

## 2022-02-25 PROCEDURE — 85027 COMPLETE CBC AUTOMATED: CPT | Performed by: NURSE PRACTITIONER

## 2022-02-25 PROCEDURE — 87493 C DIFF AMPLIFIED PROBE: CPT | Performed by: NURSE PRACTITIONER

## 2022-02-25 PROCEDURE — 87506 IADNA-DNA/RNA PROBE TQ 6-11: CPT | Performed by: NURSE PRACTITIONER

## 2022-02-25 PROCEDURE — 82274 ASSAY TEST FOR BLOOD FECAL: CPT | Performed by: NURSE PRACTITIONER

## 2022-02-25 PROCEDURE — 36415 COLL VENOUS BLD VENIPUNCTURE: CPT | Performed by: NURSE PRACTITIONER

## 2022-02-25 PROCEDURE — 87505 NFCT AGENT DETECTION GI: CPT | Performed by: NURSE PRACTITIONER

## 2022-02-25 PROCEDURE — 80076 HEPATIC FUNCTION PANEL: CPT | Performed by: NURSE PRACTITIONER

## 2022-02-25 PROCEDURE — 83630 LACTOFERRIN FECAL (QUAL): CPT | Performed by: NURSE PRACTITIONER

## 2022-02-25 PROCEDURE — 85610 PROTHROMBIN TIME: CPT | Performed by: NURSE PRACTITIONER

## 2022-02-26 LAB
ALBUMIN SERPL-MCNC: 4.6 G/DL (ref 3.5–5.2)
ALP SERPL-CCNC: 35 U/L (ref 39–117)
ALT SERPL W P-5'-P-CCNC: 25 U/L (ref 1–33)
AST SERPL-CCNC: 13 U/L (ref 1–32)
BILIRUB CONJ SERPL-MCNC: <0.2 MG/DL (ref 0–0.3)
BILIRUB INDIRECT SERPL-MCNC: ABNORMAL MG/DL
BILIRUB SERPL-MCNC: 0.4 MG/DL (ref 0–1.2)
PROT SERPL-MCNC: 7.1 G/DL (ref 6–8.5)

## 2022-02-27 LAB
C COLI+JEJ+UPSA DNA STL QL NAA+NON-PROBE: NOT DETECTED
EC STX1+STX2 GENES STL QL NAA+NON-PROBE: NOT DETECTED
S ENT+BONG DNA STL QL NAA+NON-PROBE: NOT DETECTED
SHIGELLA SP+EIEC IPAH ST NAA+NON-PROBE: NOT DETECTED

## 2022-02-28 LAB
CRYPTOSP DNA STL QL NAA+NON-PROBE: NOT DETECTED
E HISTOLYT DNA STL QL NAA+NON-PROBE: NOT DETECTED
G LAMBLIA DNA STL QL NAA+NON-PROBE: NOT DETECTED

## 2022-02-28 RX ORDER — MONTELUKAST SODIUM 4 MG/1
TABLET, CHEWABLE ORAL
Qty: 60 TABLET | Refills: 3 | Status: ON HOLD | OUTPATIENT
Start: 2022-02-28 | End: 2022-03-01

## 2022-03-01 ENCOUNTER — TELEPHONE (OUTPATIENT)
Dept: GASTROENTEROLOGY | Facility: CLINIC | Age: 67
End: 2022-03-01

## 2022-03-01 ENCOUNTER — HOSPITAL ENCOUNTER (OUTPATIENT)
Facility: HOSPITAL | Age: 67
Setting detail: HOSPITAL OUTPATIENT SURGERY
Discharge: HOME | End: 2022-03-01
Attending: INTERNAL MEDICINE | Admitting: INTERNAL MEDICINE

## 2022-03-01 ENCOUNTER — ANESTHESIA (OUTPATIENT)
Dept: GASTROENTEROLOGY | Facility: HOSPITAL | Age: 67
End: 2022-03-01

## 2022-03-01 ENCOUNTER — ANESTHESIA EVENT (OUTPATIENT)
Dept: GASTROENTEROLOGY | Facility: HOSPITAL | Age: 67
End: 2022-03-01

## 2022-03-01 VITALS
HEART RATE: 81 BPM | BODY MASS INDEX: 38.44 KG/M2 | DIASTOLIC BLOOD PRESSURE: 70 MMHG | SYSTOLIC BLOOD PRESSURE: 134 MMHG | TEMPERATURE: 97.8 F | HEIGHT: 63 IN | RESPIRATION RATE: 12 BRPM | WEIGHT: 216.93 LBS | OXYGEN SATURATION: 99 %

## 2022-03-01 DIAGNOSIS — R19.7 DIARRHEA, UNSPECIFIED TYPE: ICD-10-CM

## 2022-03-01 DIAGNOSIS — K62.5 RECTAL BLEEDING: ICD-10-CM

## 2022-03-01 LAB — GLUCOSE BLDC GLUCOMTR-MCNC: 208 MG/DL (ref 70–99)

## 2022-03-01 PROCEDURE — 25010000002 PROPOFOL 10 MG/ML EMULSION: Performed by: NURSE ANESTHETIST, CERTIFIED REGISTERED

## 2022-03-01 PROCEDURE — 88305 TISSUE EXAM BY PATHOLOGIST: CPT | Performed by: INTERNAL MEDICINE

## 2022-03-01 PROCEDURE — C1889 IMPLANT/INSERT DEVICE, NOC: HCPCS | Performed by: INTERNAL MEDICINE

## 2022-03-01 PROCEDURE — 82962 GLUCOSE BLOOD TEST: CPT

## 2022-03-01 PROCEDURE — 45390 COLONOSCOPY W/RESECTION: CPT | Performed by: INTERNAL MEDICINE

## 2022-03-01 PROCEDURE — 45385 COLONOSCOPY W/LESION REMOVAL: CPT | Performed by: INTERNAL MEDICINE

## 2022-03-01 DEVICE — DEV CLIP ENDO RESOLUTION360 CONTRL ROT 235CM: Type: IMPLANTABLE DEVICE | Site: COLON | Status: FUNCTIONAL

## 2022-03-01 RX ORDER — SODIUM CHLORIDE, SODIUM LACTATE, POTASSIUM CHLORIDE, CALCIUM CHLORIDE 600; 310; 30; 20 MG/100ML; MG/100ML; MG/100ML; MG/100ML
30 INJECTION, SOLUTION INTRAVENOUS CONTINUOUS
Status: DISCONTINUED | OUTPATIENT
Start: 2022-03-01 | End: 2022-03-01 | Stop reason: HOSPADM

## 2022-03-01 RX ORDER — LIDOCAINE HYDROCHLORIDE 20 MG/ML
INJECTION, SOLUTION INFILTRATION; PERINEURAL AS NEEDED
Status: DISCONTINUED | OUTPATIENT
Start: 2022-03-01 | End: 2022-03-01 | Stop reason: SURG

## 2022-03-01 RX ORDER — SODIUM CHLORIDE, SODIUM LACTATE, POTASSIUM CHLORIDE, CALCIUM CHLORIDE 600; 310; 30; 20 MG/100ML; MG/100ML; MG/100ML; MG/100ML
INJECTION, SOLUTION INTRAVENOUS CONTINUOUS PRN
Status: DISCONTINUED | OUTPATIENT
Start: 2022-03-01 | End: 2022-03-01 | Stop reason: SURG

## 2022-03-01 RX ADMIN — SODIUM CHLORIDE, POTASSIUM CHLORIDE, SODIUM LACTATE AND CALCIUM CHLORIDE 30 ML/HR: 600; 310; 30; 20 INJECTION, SOLUTION INTRAVENOUS at 12:45

## 2022-03-01 RX ADMIN — SODIUM CHLORIDE, POTASSIUM CHLORIDE, SODIUM LACTATE AND CALCIUM CHLORIDE: 600; 310; 30; 20 INJECTION, SOLUTION INTRAVENOUS at 13:18

## 2022-03-01 RX ADMIN — LIDOCAINE HYDROCHLORIDE 100 MG: 20 INJECTION, SOLUTION INFILTRATION; PERINEURAL at 13:28

## 2022-03-01 RX ADMIN — PROPOFOL 250 MCG/KG/MIN: 10 INJECTION, EMULSION INTRAVENOUS at 13:28

## 2022-03-01 NOTE — ANESTHESIA POSTPROCEDURE EVALUATION
Patient: Aubrie Sanon    Procedure Summary     Date: 03/01/22 Room / Location: [facility] ENDOSCOPY 4 / [facility]    Anesthesia Start: 1324 Anesthesia Stop: 1402    Procedure: COLONOSCOPY,  with polypectomy (N/A ) Diagnosis:       Diarrhea, unspecified type      Rectal bleeding      (Diarrhea, unspecified type [R19.7])      (Rectal bleeding [K62.5])    Surgeons: Domenico Suarez MD Provider: Trever Valle MD    Anesthesia Type: general ASA Status: 2          Anesthesia Type: general    Vitals  Vitals Value Taken Time   /74 03/01/22 1411   Temp 36.1 °C (96.9 °F) 03/01/22 1401   Pulse 79 03/01/22 1416   Resp 13 03/01/22 1401   SpO2 100 % 03/01/22 1416   Vitals shown include unvalidated device data.        Post Anesthesia Care and Evaluation    Patient location during evaluation: bedside  Patient participation: complete - patient participated  Level of consciousness: awake and alert  Pain management: adequate  Airway patency: patent  Anesthetic complications: No anesthetic complications  PONV Status: none  Cardiovascular status: acceptable  Respiratory status: acceptable  Hydration status: acceptable    Comments: An Anesthesiologist personally participated in the most demanding procedures (including induction and emergence if applicable) in the anesthesia plan, monitored the course of anesthesia administration at frequent intervals and remained physically present and available for immediate diagnosis and treatment of emergencies.

## 2022-03-01 NOTE — TELEPHONE ENCOUNTER
----- Message from ARIAN Liu sent at 2/28/2022  4:29 PM EST -----  Please notify pt stools neg. I will send in colestid to see if this helps w pt's diarrhea.

## 2022-03-01 NOTE — DISCHARGE INSTRUCTIONS
Monitored Anesthesia Care  Anesthesia refers to techniques, procedures, and medicines that help a person stay safe and comfortable during a medical or dental procedure. Monitored anesthesia care, or sedation, is one type of anesthesia. Your anesthesia specialist may recommend sedation if you will be having a procedure that does not require you to be unconscious. You may have this procedure for:  · Cataract surgery.  · A dental procedure.  · A biopsy.  · A colonoscopy.  During the procedure, you may receive a medicine to help you relax (sedative). There are three levels of sedation:  · Mild sedation. At this level, you may feel awake and relaxed. You will be able to follow directions.  · Moderate sedation. At this level, you will be sleepy. You may not remember the procedure.  · Deep sedation. At this level, you will be asleep. You will not remember the procedure.  The more medicine you are given, the deeper your level of sedation will be. Depending on how you respond to the procedure, the anesthesia specialist may change your level of sedation or the type of anesthesia to fit your needs. An anesthesia specialist will monitor you closely during the procedure.  Tell a health care provider about:  · Any allergies you have.  · All medicines you are taking, including vitamins, herbs, eye drops, creams, and over-the-counter medicines.  · Any problems you or family members have had with anesthetic medicines.  · Any blood disorders you have.  · Any surgeries you have had.  · Any medical conditions you have, such as sleep apnea.  · Whether you are pregnant or may be pregnant.  · Whether you use cigarettes, alcohol, or drugs.  · Any use of steroids, whether by mouth or as a cream.  What are the risks?  Generally, this is a safe procedure. However, problems may occur, including:  · Getting too much medicine (oversedation).  · Nausea.  · Allergic reaction to medicines.  · Trouble breathing. If this happens, a breathing tube may  be used to help with breathing. It will be removed when you are awake and breathing on your own.  · Heart trouble.  · Lung trouble.  · Confusion that gets better with time (emergence delirium).  What happens before the procedure?  Staying hydrated  Follow instructions from your health care provider about hydration, which may include:  · Up to 2 hours before the procedure - you may continue to drink clear liquids, such as water, clear fruit juice, black coffee, and plain tea.  Eating and drinking restrictions  Follow instructions from your health care provider about eating and drinking, which may include:  · 8 hours before the procedure - stop eating heavy meals or foods, such as meat, fried foods, or fatty foods.  · 6 hours before the procedure - stop eating light meals or foods, such as toast or cereal.  · 6 hours before the procedure - stop drinking milk or drinks that contain milk.  · 2 hours before the procedure - stop drinking clear liquids.  Medicines  Ask your health care provider about:  · Changing or stopping your regular medicines. This is especially important if you are taking diabetes medicines or blood thinners.  · Taking medicines such as aspirin and ibuprofen. These medicines can thin your blood. Do not take these medicines unless your health care provider tells you to take them.  · Taking over-the-counter medicines, vitamins, herbs, and supplements.  Tests and exams  · You will have a physical exam.  · You may have blood tests done to show:  ? How well your kidneys and liver are working.  ? How well your blood can clot.  General instructions  · Plan to have a responsible adult take you home from the hospital or clinic.  · If you will be going home right after the procedure, plan to have a responsible adult care for you for the time you are told. This is important.  What happens during the procedure?    · Your blood pressure, heart rate, breathing, level of pain, and overall condition will be  monitored.  · An IV will be inserted into one of your veins.  · You will be given medicines as needed to keep you comfortable during the procedure. This may mean changing the level of sedation.  ? Depending on your age or the procedure, the sedative may be given:  § As a pill that you will swallow or as a pill that is inserted into the rectum.  § As an injection into the vein or muscle.  § As a spray through the nose.  · The procedure will be performed.  · Your breathing, heart rate, and blood pressure will be monitored during the procedure.  · When the procedure is over, the medicine will be stopped.  The procedure may vary among health care providers and hospitals.  What happens after the procedure?  · Your blood pressure, heart rate, breathing rate, and blood oxygen level will be monitored until you leave the hospital or clinic.  · You may feel sleepy, clumsy, or nauseous.  · You may feel forgetful about what happened after the procedure.  · You may vomit.  · You may continue to get IV fluids.  · Do not drive or operate machinery until your health care provider says that it is safe.  Summary  · Monitored anesthesia care is used to keep a patient comfortable during short procedures.  · Tell your health care provider about any allergies or health conditions you have and about all the medicines you are taking.  · Before the procedure, follow instructions about when to stop eating and drinking and about changing or stopping any medicines.  · Your blood pressure, heart rate, breathing rate, and blood oxygen level will be monitored until you leave the hospital or clinic.  · Plan to have a responsible adult take you home from the hospital or clinic.  This information is not intended to replace advice given to you by your health care provider. Make sure you discuss any questions you have with your health care provider.  Document Revised: 04/23/2021 Document Reviewed: 11/19/2020  Elsevier Patient Education © 2021 [employer]  High-Fiber Eating Plan  Fiber, also called dietary fiber, is a type of carbohydrate. It is found foods such as fruits, vegetables, whole grains, and beans. A high-fiber diet can have many health benefits. Your health care provider may recommend a high-fiber diet to help:  · Prevent constipation. Fiber can make your bowel movements more regular.  · Lower your cholesterol.  · Relieve the following conditions:  ? Inflammation of veins in the anus (hemorrhoids).  ? Inflammation of specific areas of the digestive tract (uncomplicated diverticulosis).  ? A problem of the large intestine, also called the colon, that sometimes causes pain and diarrhea (irritable bowel syndrome, or IBS).  · Prevent overeating as part of a weight-loss plan.  · Prevent heart disease, type 2 diabetes, and certain cancers.  What are tips for following this plan?  Reading food labels    · Check the nutrition facts label on food products for the amount of dietary fiber. Choose foods that have 5 grams of fiber or more per serving.  · The goals for recommended daily fiber intake include:  ? Men (age 50 or younger): 34-38 g.  ? Men (over age 50): 28-34 g.  ? Women (age 50 or younger): 25-28 g.  ? Women (over age 50): 22-25 g.  Your daily fiber goal is _____________ g.  Shopping  · Choose whole fruits and vegetables instead of processed forms, such as apple juice or applesauce.  · Choose a wide variety of high-fiber foods such as avocados, lentils, oats, and kidney beans.  · Read the nutrition facts label of the foods you choose. Be aware of foods with added fiber. These foods often have high sugar and sodium amounts per serving.  Cooking  · Use whole-grain flour for baking and cooking.  · Cook with brown rice instead of white rice.  Meal planning  · Start the day with a breakfast that is high in fiber, such as a cereal that contains 5 g of fiber or more per serving.  · Eat breads and cereals that are made with whole-grain flour instead of  refined flour or white flour.  · Eat brown rice, bulgur wheat, or millet instead of white rice.  · Use beans in place of meat in soups, salads, and pasta dishes.  · Be sure that half of the grains you eat each day are whole grains.  General information  · You can get the recommended daily intake of dietary fiber by:  ? Eating a variety of fruits, vegetables, grains, nuts, and beans.  ? Taking a fiber supplement if you are not able to take in enough fiber in your diet. It is better to get fiber through food than from a supplement.  · Gradually increase how much fiber you consume. If you increase your intake of dietary fiber too quickly, you may have bloating, cramping, or gas.  · Drink plenty of water to help you digest fiber.  · Choose high-fiber snacks, such as berries, raw vegetables, nuts, and popcorn.  What foods should I eat?  Fruits  Berries. Pears. Apples. Oranges. Avocado. Prunes and raisins. Dried figs.  Vegetables  Sweet potatoes. Spinach. Kale. Artichokes. Cabbage. Broccoli. Cauliflower. Green peas. Carrots. Squash.  Grains  Whole-grain breads. Multigrain cereal. Oats and oatmeal. Brown rice. Barley. Bulgur wheat. Millet. Quinoa. Bran muffins. Popcorn. Rye wafer crackers.  Meats and other proteins  Navy beans, kidney beans, and park beans. Soybeans. Split peas. Lentils. Nuts and seeds.  Dairy  Fiber-fortified yogurt.  Beverages  Fiber-fortified soy milk. Fiber-fortified orange juice.  Other foods  Fiber bars.  The items listed above may not be a complete list of recommended foods and beverages. Contact a dietitian for more information.  What foods should I avoid?  Fruits  Fruit juice. Cooked, strained fruit.  Vegetables  Fried potatoes. Canned vegetables. Well-cooked vegetables.  Grains  White bread. Pasta made with refined flour. White rice.  Meats and other proteins  Fatty cuts of meat. Fried chicken or fried fish.  Dairy  Milk. Yogurt. Cream cheese. Sour cream.  Fats and  oils  [place].  Beverages  Soft drinks.  Other foods  Cakes and pastries.  The items listed above may not be a complete list of foods and beverages to avoid. Talk with your dietitian about what choices are best for you.  Summary  · Fiber is a type of carbohydrate. It is found in foods such as fruits, vegetables, whole grains, and beans.  · A high-fiber diet has many benefits. It can help to prevent constipation, lower blood cholesterol, aid weight loss, and reduce your risk of heart disease, diabetes, and certain cancers.  · Increase your intake of fiber gradually. Increasing fiber too quickly may cause cramping, bloating, and gas. Drink plenty of water while you increase the amount of fiber you consume.  · The best sources of fiber include whole fruits and vegetables, whole grains, nuts, seeds, and beans.  This information is not intended to replace advice given to you by your health care provider. Make sure you discuss any questions you have with your health care provider.  Document Revised: 04/22/2021 Document Reviewed: 04/22/2021  [employer] Patient Education © 2021 [employer]  Diverticulosis    Diverticulosis is a condition that develops when small pouches (diverticula) form in the wall of the large intestine (colon). The colon is where water is absorbed and stool (feces) is formed. The pouches form when the inside layer of the colon pushes through weak spots in the outer layers of the colon. You may have a few pouches or many of them.  The pouches usually do not cause problems unless they become inflamed or infected. When this happens, the condition is called diverticulitis.  What are the causes?  The cause of this condition is not known.  What increases the risk?  The following factors may make you more likely to develop this condition:  · Being older than age 60. Your risk for this condition increases with age. Diverticulosis is rare among people younger than age 30. By age 80, many people have  it.  · Eating a low-fiber diet.  · Having frequent constipation.  · Being overweight.  · Not getting enough exercise.  · Smoking.  · Taking over-the-counter pain medicines, like aspirin and ibuprofen.  · Having a family history of diverticulosis.  What are the signs or symptoms?  In most people, there are no symptoms of this condition. If you do have symptoms, they may include:  · Bloating.  · Cramps in the abdomen.  · Constipation or diarrhea.  · Pain in the lower left side of the abdomen.  How is this diagnosed?  Because diverticulosis usually has no symptoms, it is most often diagnosed during an exam for other colon problems. The condition may be diagnosed by:  · Using a flexible scope to examine the colon (colonoscopy).  · Taking an X-ray of the colon after dye has been put into the colon (barium enema).  · Having a CT scan.  How is this treated?  You may not need treatment for this condition. Your health care provider may recommend treatment to prevent problems. You may need treatment if you have symptoms or if you previously had diverticulitis. Treatment may include:  · Eating a high-fiber diet.  · Taking a fiber supplement.  · Taking a live bacteria supplement (probiotic).  · Taking medicine to relax your colon.  Follow these instructions at home:  Medicines  · Take over-the-counter and prescription medicines only as told by your health care provider.  · If told by your health care provider, take a fiber supplement or probiotic.  Constipation prevention  Your condition may cause constipation. To prevent or treat constipation, you may need to:  · Drink enough fluid to keep your urine pale yellow.  · Take over-the-counter or prescription medicines.  · Eat foods that are high in fiber, such as beans, whole grains, and fresh fruits and vegetables.  · Limit foods that are high in fat and processed sugars, such as fried or sweet foods.    General instructions  · Try not to strain when you have a bowel  movement.  · Keep all follow-up visits as told by your health care provider. This is important.  Contact a health care provider if you:  · Have pain in your abdomen.  · Have bloating.  · Have cramps.  · Have not had a bowel movement in 3 days.  Get help right away if:  · Your pain gets worse.  · Your bloating becomes very bad.  · You have a fever or chills, and your symptoms suddenly get worse.  · You vomit.  · You have bowel movements that are bloody or black.  · You have bleeding from your rectum.  Summary  · Diverticulosis is a condition that develops when small pouches (diverticula) form in the wall of the large intestine (colon).  · You may have a few pouches or many of them.  · This condition is most often diagnosed during an exam for other colon problems.  · Treatment may include increasing the fiber in your diet, taking supplements, or taking medicines.  This information is not intended to replace advice given to you by your health care provider. Make sure you discuss any questions you have with your health care provider.  Document Revised: 07/16/2020 Document Reviewed: 07/16/2020  [employer] Patient Education © 2021 [employer]  Colon Polyps    Colon polyps are tissue growths inside the colon, which is part of the large intestine. They are one of the types of polyps that can grow in the body. A polyp may be a round bump or a mushroom-shaped growth. You could have one polyp or more than one.  Most colon polyps are noncancerous (benign). However, some colon polyps can become cancerous over time. Finding and removing the polyps early can help prevent this.  What are the causes?  The exact cause of colon polyps is not known.  What increases the risk?  The following factors may make you more likely to develop this condition:  · Having a family history of colorectal cancer or colon polyps.  · Being older than 45 years of age.  · Being younger than 45 years of age and having a significant family history of  colorectal cancer or colon polyps or a genetic condition that puts you at higher risk of getting colon polyps.  · Having inflammatory bowel disease, such as ulcerative colitis or Crohn's disease.  · Having certain conditions passed from parent to child (hereditary conditions), such as:  ? Familial adenomatous polyposis (FAP).  ? Jennings syndrome.  ? Turcot syndrome.  ? Peutz-Jeghers syndrome.  ? MUTYH-associated polyposis (MAP).  · Being overweight.  · Certain lifestyle factors. These include smoking cigarettes, drinking too much alcohol, not getting enough exercise, and eating a diet that is high in fat and red meat and low in fiber.  · Having had childhood cancer that was treated with radiation of the abdomen.  What are the signs or symptoms?  Many times, there are no symptoms.  If you have symptoms, they may include:  · Blood coming from the rectum during a bowel movement.  · Blood in the stool (feces). The blood may be bright red or very dark in color.  · Pain in the abdomen.  · A change in bowel habits, such as constipation or diarrhea.  How is this diagnosed?  This condition is diagnosed with a colonoscopy. This is a procedure in which a lighted, flexible scope is inserted into the opening between the buttocks (anus) and then passed into the colon to examine the area. Polyps are sometimes found when a colonoscopy is done as part of routine cancer screening tests.  How is this treated?  This condition is treated by removing any polyps that are found. Most polyps can be removed during a colonoscopy. Those polyps will then be tested for cancer. Additional treatment may be needed depending on the results of testing.  Follow these instructions at home:  Eating and drinking    · Eat foods that are high in fiber, such as fruits, vegetables, and whole grains.  · Eat foods that are high in calcium and vitamin D, such as milk, cheese, yogurt, eggs, liver, fish, and broccoli.  · Limit foods that are high in fat, such as  fried foods and desserts.  · Limit the amount of red meat, precooked or cured meat, or other processed meat that you eat, such as hot dogs, sausages, cain, or meat loaves.  · Limit sugary drinks.    Lifestyle  · Maintain a healthy weight, or lose weight if recommended by your health care provider.  · Exercise every day or as told by your health care provider.  · Do not use any products that contain nicotine or tobacco, such as cigarettes, e-cigarettes, and chewing tobacco. If you need help quitting, ask your health care provider.  · Do not drink alcohol if:  ? Your health care provider tells you not to drink.  ? You are pregnant, may be pregnant, or are planning to become pregnant.  · If you drink alcohol:  ? Limit how much you use to:  § 0-1 drink a day for women.  § 0-2 drinks a day for men.  ? Know how much alcohol is in your drink. In the [place] one drink equals one 12 oz bottle of beer (355 mL), one 5 oz glass of wine (148 mL), or one 1½ oz glass of hard liquor (44 mL).  General instructions  · Take over-the-counter and prescription medicines only as told by your health care provider.  · Keep all follow-up visits. This is important. This includes having regularly scheduled colonoscopies. Talk to your health care provider about when you need a colonoscopy.  Contact a health care provider if:  · You have new or worsening bleeding during a bowel movement.  · You have new or increased blood in your stool.  · You have a change in bowel habits.  · You lose weight for no known reason.  Summary  · Colon polyps are tissue growths inside the colon, which is part of the large intestine. They are one type of polyp that can grow in the body.  · Most colon polyps are noncancerous (benign), but some can become cancerous over time.  · This condition is diagnosed with a colonoscopy.  · This condition is treated by removing any polyps that are found. Most polyps can be removed during a colonoscopy.  This information is not  intended to replace advice given to you by your health care provider. Make sure you discuss any questions you have with your health care provider.  Document Revised: 04/07/2021 Document Reviewed: 04/07/2021  [employer] Patient Education © 2021 [employer]  Colonoscopy, Adult, Care After  This sheet gives you information about how to care for yourself after your procedure. Your doctor may also give you more specific instructions. If you have problems or questions, call your doctor.  What can I expect after the procedure?  After the procedure, it is common to have:  · A small amount of blood in your poop (stool) for 24 hours.  · Some gas.  · Mild cramping or bloating in your belly (abdomen).  Follow these instructions at home:  Eating and drinking    · Drink enough fluid to keep your pee (urine) pale yellow.  · Follow instructions from your doctor about what you cannot eat or drink.  · Return to your normal diet as told by your doctor. Avoid heavy or fried foods that are hard to digest.    Activity  · Rest as told by your doctor.  · Do not sit for a long time without moving. Get up to take short walks every 1-2 hours. This is important. Ask for help if you feel weak or unsteady.  · Return to your normal activities as told by your doctor. Ask your doctor what activities are safe for you.  To help cramping and bloating:    · Try walking around.  · Put heat on your belly as told by your doctor. Use the heat source that your doctor recommends, such as a moist heat pack or a heating pad.  ? Put a towel between your skin and the heat source.  ? Leave the heat on for 20-30 minutes.  ? Remove the heat if your skin turns bright red. This is very important if you are unable to feel pain, heat, or cold. You may have a greater risk of getting burned.    General instructions  · If you were given a medicine to help you relax (sedative) during your procedure, it can affect you for many hours. Do not drive or use machinery until your  doctor says that it is safe.  · For the first 24 hours after the procedure:  ? Do not sign important documents.  ? Do not drink alcohol.  ? Do your daily activities more slowly than normal.  ? Eat foods that are soft and easy to digest.  · Take over-the-counter or prescription medicines only as told by your doctor.  · Keep all follow-up visits as told by your doctor. This is important.  Contact a doctor if:  · You have blood in your poop 2-3 days after the procedure.  Get help right away if:  · You have more than a small amount of blood in your poop.  · You see large clumps of tissue (blood clots) in your poop.  · Your belly is swollen.  · You feel like you may vomit (nauseous).  · You vomit.  · You have a fever.  · You have belly pain that gets worse, and medicine does not help your pain.  Summary  · After the procedure, it is common to have a small amount of blood in your poop. You may also have mild cramping and bloating in your belly.  · If you were given a medicine to help you relax (sedative) during your procedure, it can affect you for many hours. Do not drive or use machinery until your doctor says that it is safe.  · Get help right away if you have a lot of blood in your poop, feel like you may vomit, have a fever, or have more belly pain.  This information is not intended to replace advice given to you by your health care provider. Make sure you discuss any questions you have with your health care provider.  Document Revised: 10/23/2020 Document Reviewed: 07/13/2020  [employer] Patient Education © 2021 [employer]

## 2022-03-01 NOTE — ANESTHESIA PREPROCEDURE EVALUATION
Anesthesia Evaluation     Patient summary reviewed and Nursing notes reviewed   no history of anesthetic complications:  NPO Solid Status: > 8 hours  NPO Liquid Status: > 2 hours           Airway   Mallampati: III  TM distance: >3 FB  Neck ROM: full  No difficulty expected  Dental      Pulmonary - normal exam    breath sounds clear to auscultation  (+) COPD, asthma,  Cardiovascular - normal exam  Exercise tolerance: good (4-7 METS)    Rhythm: regular  Rate: normal    (+) hypertension, valvular problems/murmurs,       Neuro/Psych  (+) numbness, psychiatric history,    GI/Hepatic/Renal/Endo    (+)  GERD, GI bleeding , diabetes mellitus type 2,     Musculoskeletal     Abdominal    Substance History - negative use     OB/GYN negative ob/gyn ROS         Other   arthritis,                      Anesthesia Plan    ASA 2     general   (Total IV Anesthesia    Patient understands anesthesia not responsible for dental damage.  )  intravenous induction     Anesthetic plan, all risks, benefits, and alternatives have been provided, discussed and informed consent has been obtained with: patient.    Plan discussed with CRNA.        CODE STATUS:

## 2022-03-01 NOTE — H&P
Pre Procedure History & Physical    Chief Complaint:   Diarrhea and rectal bleeding    Subjective     HPI:   Bleeding and diarrhea    Past Medical History:   Past Medical History:   Diagnosis Date   • Anxiety    • Arthritis     OSTEOPOROSIS LEFT HIP   • Asthma    • COPD (chronic obstructive pulmonary disease) ([facility])     EMPHYSEMA- INHALERS PRN   • Depression    • Diabetes (HCC)     DOESN'T CHECK BG OFTEN AT HOME   • Heart murmur     ASYMPTOMATIC. DENIES CP/SOB, SEE'S PCP   • Hematuria CURRENTLY   • Hypertension        Past Surgical History:  Past Surgical History:   Procedure Laterality Date   • BLADDER REPAIR     • COLONOSCOPY     • CYSTOSCOPY     • CYSTOSCOPY BLADDER BIOPSY N/A 11/2/2021    Procedure: CYSTOSCOPY BLADDER BIOPSY WITH FULGURATION;  Surgeon: Fadumo Silva MD;  Location: [facility] OR;  Service: Urology;  Laterality: N/A;   • HYSTERECTOMY      PARTIAL HYST- RETROCELE, PROLAPSED UTERUS REPAIR   • JOINT REPLACEMENT      RIGHT TKR   • KNEE SURGERY Right     RIGHT TKR   • OTHER SURGICAL HISTORY      RECTOCELE REPAIR   • UPPER GASTROINTESTINAL ENDOSCOPY  12/30/2015    Dr. Mark Miguel       Family History:  Family History   Problem Relation Age of Onset   • Colon cancer Paternal Grandmother    • Cancer Paternal Grandmother    • Cancer Brother    • Malig Hyperthermia Neg Hx        Social History:   reports that she quit smoking about 22 years ago. Her smoking use included cigarettes. She quit after 1.00 year of use. She has never used smokeless tobacco. She reports that she does not drink alcohol and does not use drugs.    Medications:   Medications Prior to Admission   Medication Sig Dispense Refill Last Dose   • aspirin 81 MG chewable tablet Chew 162 mg Daily. LAST DOSE Sunday 2/27   Past Week at Unknown time   • Biotin 5000 MCG tablet Take 1 tablet by mouth Daily.   Past Week at Unknown time   • buPROPion XL (WELLBUTRIN XL) 150 MG 24 hr tablet Take 150 mg by mouth Every Morning.   Past Week at  Unknown time   • cholecalciferol (VITAMIN D3) 25 MCG (1000 UT) tablet Take 3,000 Units by mouth Daily.   Past Week at Unknown time   • clonazePAM (KlonoPIN) 1 MG tablet Take 1 mg by mouth 2 (Two) Times a Day. TK 1 T PO BID  2 Past Week at Unknown time   • Cyanocobalamin (VITAMIN B-12 IJ) Inject 1,000 Units as directed Every 30 (Thirty) Days.   Past Month at Unknown time   • doxepin (SINEquan) 50 MG capsule Take 50 mg by mouth Every Night.   Past Week at Unknown time   • empagliflozin (Jardiance) 25 MG tablet tablet Take 25 mg by mouth Daily.   Past Week at Unknown time   • insulin lispro (humaLOG) 100 UNIT/ML injection Inject 80 Units under the skin into the appropriate area as directed Every Morning. 80 UNITS IN MORNING  100 UNITS AT NIGHT   Past Week at Unknown time   • isosorbide mononitrate (IMDUR) 30 MG 24 hr tablet Take 30 mg by mouth Daily.   Past Week at Unknown time   • lisinopril (PRINIVIL,ZESTRIL) 2.5 MG tablet Take 2.5 mg by mouth Daily.   Past Week at Unknown time   • metaxalone (SKELAXIN) 800 MG tablet Take 1 tablet by mouth At Night As Needed for Muscle Spasms. (Patient taking differently: Take 800 mg by mouth 3 (Three) Times a Day As Needed for Muscle Spasms.) 30 tablet 1 Past Week at Unknown time   • metoprolol succinate XL (TOPROL-XL) 25 MG 24 hr tablet Take 25 mg by mouth Every Night.   Past Week at Unknown time   • montelukast (SINGULAIR) 10 MG tablet Take 10 mg by mouth Daily.  2 Past Week at Unknown time   • simvastatin (ZOCOR) 40 MG tablet Take 40 mg by mouth Every Night. TK 1 T PO HS.  0 Past Week at Unknown time   • denosumab (Prolia) 60 MG/ML solution prefilled syringe syringe Inject 60 mg under the skin into the appropriate area as directed 1 (One) Time. EVERY 6 MONTHS   More than a month at Unknown time   • mometasone (NASONEX) 50 MCG/ACT nasal spray 1 spray into the nostril(s) as directed by provider As Needed.   More than a month at Unknown time   • VENTOLIN  (90 BASE) MCG/ACT  "inhaler Inhale 1 puff As Needed.  1 More than a month at Unknown time       Allergies:  Carisoprodol and Contrast dye        Objective     Blood pressure 159/66, pulse 95, temperature 97.6 °F (36.4 °C), temperature source Temporal, resp. rate 16, height 160 cm (62.99\"), weight 98.4 kg (216 lb 14.9 oz), SpO2 98 %.    Physical Exam   Constitutional: Pt is oriented to person, place, and time and well-developed, well-nourished, and in no distress.   Mouth/Throat: Oropharynx is clear and moist.   Neck: Normal range of motion.   Cardiovascular: Normal rate, regular rhythm and normal heart sounds.    Pulmonary/Chest: Effort normal and breath sounds normal.   Abdominal: Soft. Nontender  Skin: Skin is warm and dry.   Psychiatric: Mood, memory, affect and judgment normal.     Assessment/Plan     Diagnosis:  Chronic diarrhea and rectal bleeding    Anticipated Surgical Procedure:  Colonoscopy    The risks, benefits, and alternatives of this procedure have been discussed with the patient or the responsible party- the patient understands and agrees to proceed.            "

## 2022-03-01 NOTE — TELEPHONE ENCOUNTER
I called and spoke to the patient and gave results. The patient is agreeable to trying but thinks she has some at home already. If symptoms do not improve patient will contact the office.

## 2022-03-10 ENCOUNTER — TELEPHONE (OUTPATIENT)
Dept: GASTROENTEROLOGY | Facility: CLINIC | Age: 67
End: 2022-03-10

## 2022-03-10 NOTE — TELEPHONE ENCOUNTER
"Follow-Up Scheduled: 3.23.22  Colonoscopy: 3.1.22  Last Office Visit: 2.15.22    Pt called to advise Colestipol is not working for her. She states, \"I tried this medicine in the past and I told 'her' this didn't help me, but I thought I would try it again.\" She advised that she might have 2-3 days where she passes a small amount of loose stools then a day where she has up to 7 diarrhea BMs with fecal urgency and accidents. She has been taking Colestipol 1gm 2 PO QHS since 2.28.22 and is now requesting something else. Please advise.     "

## 2022-03-11 NOTE — TELEPHONE ENCOUNTER
Yes , also Colestid can be dosed higher, I usually start w 2 at night, but she can also try 2 in AM and 2 in PM and just hold if no BM x 24 hrs.   Alternatively we can try with or w/o Colestid, Hyoscyamine, to see if this helps. PT can take before eating, allie before eating out or any particular foods known to cause cramping or diarrhea. I will send in

## 2022-03-14 ENCOUNTER — TELEPHONE (OUTPATIENT)
Dept: GASTROENTEROLOGY | Facility: CLINIC | Age: 67
End: 2022-03-14

## 2022-03-14 NOTE — TELEPHONE ENCOUNTER
I have spoken with the patient and she still does not feel like the Colestipol is working. The patient is having accidents and worse when she eats out. The pharmacy did not call the patient about the hyoscyamine so she is checking w/ them about the medication.

## 2022-03-14 NOTE — TELEPHONE ENCOUNTER
I spoke with patient and she is agreeable to stopping the Colestipol. Patient will call and let office know how she does on just taking the hyoscyamine.

## 2022-03-14 NOTE — TELEPHONE ENCOUNTER
"I spoke with the patient again and advised on recommendation and patient states \"the Colestipol is giving her a headache now\". The patient is now taking Advil but really does not want to continue taking the Colestipol. Elisha had to order the hyoscyamine so she won't be able to  until after 3:00 pm today.  "

## 2022-03-14 NOTE — TELEPHONE ENCOUNTER
Ok let me know if there is issue w Hyoscyamine. Pt may cont to take Colestid with it. As per previous TE, pt may take Colestid 2 in AM and 2 in PM, we can also increase dose to 3 po BID

## 2022-03-16 ENCOUNTER — TELEPHONE (OUTPATIENT)
Dept: GASTROENTEROLOGY | Facility: CLINIC | Age: 67
End: 2022-03-16

## 2022-03-16 NOTE — TELEPHONE ENCOUNTER
I spoke with the patient and informed her of the change in medication. I did speak with the APRN about the patient taking double of the hyoscyamine prescription and this is allowed. Patient not to accede 2 tab 4 times a day. The patient understands [facility] will contact her to set up shipping of the Xifaxan.

## 2022-03-16 NOTE — TELEPHONE ENCOUNTER
"The called and states \"she has been taking the hyoscysamine and there has been no change, she is still having diarrhea at least 5 bm in the morning\". The patient wants to know what else can be done.  "

## 2022-03-16 NOTE — TELEPHONE ENCOUNTER
"Please notify pt I will send in Xifaxan to trial - this is a special antibiotic used for IBS-D, please explain she will get a call from [facility] and she has to \"OK\" over the phone w them to receive this medication. If diarrhea is d/t an imbalance with good and bad gut bacteria this will work, if that is not what is driving her diarrhea symptom then it will not help.  There is no way to test for this problem we just have to try the medicine to see if this works.  "

## 2022-03-23 ENCOUNTER — OFFICE VISIT (OUTPATIENT)
Dept: GASTROENTEROLOGY | Facility: CLINIC | Age: 67
End: 2022-03-23

## 2022-03-23 VITALS
SYSTOLIC BLOOD PRESSURE: 114 MMHG | WEIGHT: 222.6 LBS | BODY MASS INDEX: 39.44 KG/M2 | DIASTOLIC BLOOD PRESSURE: 50 MMHG | HEART RATE: 74 BPM | HEIGHT: 63 IN

## 2022-03-23 DIAGNOSIS — Z86.010 HISTORY OF COLON POLYPS: ICD-10-CM

## 2022-03-23 DIAGNOSIS — K57.90 DIVERTICULOSIS: ICD-10-CM

## 2022-03-23 DIAGNOSIS — K58.0 IRRITABLE BOWEL SYNDROME WITH DIARRHEA: Primary | ICD-10-CM

## 2022-03-23 PROCEDURE — 99214 OFFICE O/P EST MOD 30 MIN: CPT | Performed by: NURSE PRACTITIONER

## 2022-03-23 NOTE — PROGRESS NOTES
Patient Name: Aubrie Sanon   Visit Date: 03/23/2022   Patient ID: [id number]  Provider: ARIAN Liu    Sex: female  Location:  Location Address:  Location Phone: [phone]  [place] [address] [phone]    YOB: 1955  Age: 66 y.o.   Primary Care Provider Melani Bae MD      Referring Provider: No ref. provider found        Chief Complaint  Colonoscopy (Follow up) and Diarrhea (Xifaxan will cost the patient over $700 for the Xifaxan-too expensive)    History of Present Illness    Patient initially presented February 15 with complaint of diarrhea for 10 years, reported she cannot eat out eat salads, or chocolate.  Fecal incontinence occurs related to fecal urgency, 7-10 stools per day, no weight loss no nocturnal stools.  CT the abdomen and pelvis with and without contrast 8/5/2021: Fatty liver noted, report also states liver and spleen are normal in size and contour, no acute abnormalities noted diverticulosis    2/25/2022: Liver enzymes within normal limits, INR 1.0, CBC unremarkable    2/25/2022: Stools negative for occult blood, parasite, culture, lactoferrin, C. difficile-Colestid was sent in however patient called later and reported it was not helpful she was taking 2 at night, advised her she could try to twice a day and sent in hyoscyamine, she also reported Colestid caused a headache; she called back reporting hyoscyamine was not helpful so Xifaxan was prescribed 3/16/22    Colonoscopy 3/1/2022: 20 mm adenomatous and hyperplastic polyp at the hepatic flexure completely removed, 2 small adenomatous polyps in the rectosigmoid colon 5 to 10 mm, diverticula in the sigmoid-3-year colon recall    Pt is currently taking Hyoscyamine (2) 0.125 mg QID SL, and still w diarrhea and cramping. Having 5 stools/day, occasionally no BM or minimal BM in a day. C/o HA. States cannot eat out, has diarrhea after, FI is improved. [place] #7, occasionally 6. No nocturnal stools.   Pt  "states Xifaxin 700$ and she cannot afford.     Past Medical History:   Diagnosis Date   • Anxiety    • Arthritis     OSTEOPOROSIS LEFT HIP   • Asthma    • COPD (chronic obstructive pulmonary disease) (HCC)     EMPHYSEMA- INHALERS PRN   • Depression    • Diabetes (HCC)     DOESN'T CHECK BG OFTEN AT HOME   • Heart murmur     ASYMPTOMATIC. DENIES CP/SOB, SEE'S PCP   • Hematuria CURRENTLY   • Hypertension        Past Surgical History:   Procedure Laterality Date   • BLADDER REPAIR     • COLONOSCOPY     • COLONOSCOPY N/A 3/1/2022    Procedure: COLONOSCOPY,  with polypectomy;  Surgeon: Domenico Suarez MD;  Location: [facility] ENDOSCOPY;  Service: Gastroenterology;  Laterality: N/A;  colon polyps and diverticulosis   • CYSTOSCOPY     • CYSTOSCOPY BLADDER BIOPSY N/A 2021    Procedure: CYSTOSCOPY BLADDER BIOPSY WITH FULGURATION;  Surgeon: Fadumo Silva MD;  Location: [facility] MAIN OR;  Service: Urology;  Laterality: N/A;   • HYSTERECTOMY      PARTIAL HYST- RETROCELE, PROLAPSED UTERUS REPAIR   • JOINT REPLACEMENT      RIGHT TKR   • KNEE SURGERY Right     RIGHT TKR   • OTHER SURGICAL HISTORY      RECTOCELE REPAIR   • UPPER GASTROINTESTINAL ENDOSCOPY  2015    Dr. Mark Miguel       Allergies   Allergen Reactions   • Carisoprodol Hives   • Contrast Dye Hives and Itching       Family History   Problem Relation Age of Onset   • Colon cancer Paternal Grandmother    • Cancer Paternal Grandmother    • Cancer Brother    • Malig Hyperthermia Neg Hx         Social History     Tobacco Use   • Smoking status: Former Smoker     Years: 1.00     Types: Cigarettes     Quit date: 2000     Years since quittin.0   • Smokeless tobacco: Never Used   Vaping Use   • Vaping Use: Never used   Substance Use Topics   • Alcohol use: Never   • Drug use: Never       Objective     Vital Signs:   /50 (BP Location: Left arm, Patient Position: Sitting, Cuff Size: Adult)   Pulse 74   Ht 160 cm (62.99\")   Wt 101 kg (222 lb " 9.6 oz)   BMI 39.44 kg/m²       Physical Exam  Constitutional:       General: The patient is not in acute distress.     Appearance: Normal appearance.   HENT:      Head: Normocephalic and atraumatic.      Nose: Nose normal.   Pulmonary:      Effort: Pulmonary effort is normal. No respiratory distress.   Abdominal:      General: Abdomen is flat.      Palpations: Abdomen is soft. There is no mass.      Tenderness: There is no abdominal tenderness. There is no guarding.   Musculoskeletal:      Cervical back: Neck supple.      Right lower leg: No edema.      Left lower leg: No edema.   Skin:     General: Skin is warm and dry.   Neurological:      General: No focal deficit present.      Mental Status: The patient is alert and oriented to person, place, and time.      Gait: Gait normal.   Psychiatric:         Mood and Affect: Mood normal.         Speech: Speech normal.         Behavior: Behavior normal.         Thought Content: Thought content normal.     Result Review :   The following data was reviewed by: ARIAN Liu on 03/23/2022:                    Assessment and Plan    Diagnoses and all orders for this visit:    1. Irritable bowel syndrome with diarrhea (Primary)    2. History of colon polyps    3. Diverticulosis            Follow Up   Return in about 2 months (around 5/23/2022).   Xifaxin trial -- will give samples -- start Florajen 3 after finishing  Call if no improvement   Recall colon 3 yrs.  R/W pt negative w/u so far.   Patient was given instructions and counseling regarding her condition or for health maintenance advice. Please see specific information pulled into the AVS if appropriate.

## 2022-04-18 ENCOUNTER — TELEPHONE (OUTPATIENT)
Dept: GASTROENTEROLOGY | Facility: CLINIC | Age: 67
End: 2022-04-18

## 2022-04-18 NOTE — TELEPHONE ENCOUNTER
The pt called and states she is unable to afford the Xifaxan. Elisha states her co-pay is over $500 and with the recent death of her son this is not something she can pay. She wants to know if she can get samples again? Please advise.

## 2022-04-20 NOTE — TELEPHONE ENCOUNTER
We have enough samples to provide for 1 week to get the patient to her f/u 4/27/22. The pt will be by to  samples. I have left at the .

## 2022-04-20 NOTE — TELEPHONE ENCOUNTER
If the first set of samples helped her, we can give 1 week of samples if we have enough (take TID), if we have large supply of samples can give 2 weeks (#42 pills)  Keep f/u

## 2022-04-27 ENCOUNTER — OFFICE VISIT (OUTPATIENT)
Dept: GASTROENTEROLOGY | Facility: CLINIC | Age: 67
End: 2022-04-27

## 2022-04-27 VITALS
HEART RATE: 81 BPM | SYSTOLIC BLOOD PRESSURE: 116 MMHG | WEIGHT: 221.8 LBS | HEIGHT: 63 IN | DIASTOLIC BLOOD PRESSURE: 65 MMHG | BODY MASS INDEX: 39.3 KG/M2

## 2022-04-27 DIAGNOSIS — Z86.010 HISTORY OF COLON POLYPS: ICD-10-CM

## 2022-04-27 DIAGNOSIS — K57.90 DIVERTICULOSIS: ICD-10-CM

## 2022-04-27 DIAGNOSIS — K58.0 IRRITABLE BOWEL SYNDROME WITH DIARRHEA: Primary | ICD-10-CM

## 2022-04-27 DIAGNOSIS — K76.0 FATTY LIVER: ICD-10-CM

## 2022-04-27 PROCEDURE — 99214 OFFICE O/P EST MOD 30 MIN: CPT | Performed by: NURSE PRACTITIONER

## 2022-04-27 RX ORDER — RIFAXIMIN 550 MG/1
TABLET ORAL
COMMUNITY
Start: 2022-04-15

## 2022-04-27 NOTE — PROGRESS NOTES
Patient Name: Aubrie Sanon   Visit Date: 04/27/2022   Patient ID: [id number]  Provider: ARIAN Liu    Sex: female  Location:  Location Address:  Location Phone: [phone] [place] [address] [phone]    YOB: 1955  Age: 66 y.o.   Primary Care Provider Melani Bae MD      Referring Provider: No ref. provider found        Chief Complaint  Diarrhea (Had one occurrence of some slime consistency, some bowels are formed now.) and Abdominal Pain (Lots of gas off and on varies in intensity. LLQ pain. )    History of Present Illness    Patient initially presented February 2022 with complaint of diarrhea for 10 years, reported she cannot eat out eat salads, or chocolate.  Fecal incontinence occurs related to fecal urgency, 7-10 stools per day, no weight loss no nocturnal stools.  CT the abdomen and pelvis with and without contrast 8/5/2021: Fatty liver noted, report also states liver and spleen are normal in size and contour, no acute abnormalities noted diverticulosis     2/25/2022: Liver enzymes within normal limits, INR 1.0, CBC unremarkable     2/25/2022: Stools negative for occult blood, parasite, culture, lactoferrin, C. difficile-Colestid was sent in however patient called later and reported it was not helpful she was taking 2 at night, advised her she could try to twice a day and sent in hyoscyamine, she also reported Colestid caused a headache; she called back reporting hyoscyamine was not helpful so Xifaxan was prescribed 3/16/22 - pt could not afford and samples were given 3/23/22 at last visit.      Colonoscopy 3/1/2022: 20 mm adenomatous and hyperplastic polyp at the hepatic flexure completely removed, 2 small adenomatous polyps in the rectosigmoid colon 5 to 10 mm, diverticula in the sigmoid-3-year colon recall     Pt states Xifaxan helped, having 2-3 stools/day, pt states higher dose of levsin (2) before meals is helping w abd pain/ and diarrhea. Frequently now  having formed stools but soft. No blood in stool usually, has occurred if straining. Trying fiber daily.   Pt took 7 more days of Xifaxin samples, she has 1 more day left.   Pt reports her son passed away unexpectedly last month. Her appetite has been affected. Naturally she has been very upset about this.   Past Medical History:   Diagnosis Date   • Anxiety    • Arthritis     OSTEOPOROSIS LEFT HIP   • Asthma    • COPD (chronic obstructive pulmonary disease) ([facility])     EMPHYSEMA- INHALERS PRN   • Depression    • Diabetes (HCC)     DOESN'T CHECK BG OFTEN AT HOME   • Heart murmur     ASYMPTOMATIC. DENIES CP/SOB, SEE'S PCP   • Hematuria CURRENTLY   • Hypertension        Past Surgical History:   Procedure Laterality Date   • BLADDER REPAIR     • COLONOSCOPY     • COLONOSCOPY N/A 3/1/2022    Procedure: COLONOSCOPY,  with polypectomy;  Surgeon: Domenico Suarez MD;  Location: [facility] ENDOSCOPY;  Service: Gastroenterology;  Laterality: N/A;  colon polyps and diverticulosis   • CYSTOSCOPY     • CYSTOSCOPY BLADDER BIOPSY N/A 2021    Procedure: CYSTOSCOPY BLADDER BIOPSY WITH FULGURATION;  Surgeon: Fadumo Silva MD;  Location: [facility] MAIN OR;  Service: Urology;  Laterality: N/A;   • HYSTERECTOMY      PARTIAL HYST- RETROCELE, PROLAPSED UTERUS REPAIR   • JOINT REPLACEMENT      RIGHT TKR   • KNEE SURGERY Right     RIGHT TKR   • OTHER SURGICAL HISTORY      RECTOCELE REPAIR   • UPPER GASTROINTESTINAL ENDOSCOPY  2015    Dr. Mark Miguel       Allergies   Allergen Reactions   • Carisoprodol Hives   • Contrast Dye Hives and Itching       Family History   Problem Relation Age of Onset   • Colon cancer Paternal Grandmother    • Cancer Paternal Grandmother    • Cancer Brother    • Malig Hyperthermia Neg Hx         Social History     Tobacco Use   • Smoking status: Former Smoker     Years: 1.00     Types: Cigarettes     Quit date: 2000     Years since quittin.1   • Smokeless tobacco: Never Used   Vaping Use  "  • Vaping Use: Never used   Substance Use Topics   • Alcohol use: Never   • Drug use: Never       Objective     Vital Signs:   /65 (BP Location: Left arm, Patient Position: Sitting, Cuff Size: Adult)   Pulse 81   Ht 160 cm (62.99\")   Wt 101 kg (221 lb 12.8 oz)   BMI 39.30 kg/m²       Physical Exam  Constitutional:       General: The patient is not in acute distress.     Appearance: Normal appearance.   HENT:      Head: Normocephalic and atraumatic.      Nose: Nose normal.   Pulmonary:      Effort: Pulmonary effort is normal. No respiratory distress.   Abdominal:      General: Abdomen is flat.      Palpations: Abdomen is soft. There is no mass.      Tenderness: There is no abdominal tenderness. There is no guarding.   Musculoskeletal:      Cervical back: Neck supple.      Right lower leg: No edema.      Left lower leg: No edema.   Skin:     General: Skin is warm and dry.   Neurological:      General: No focal deficit present.      Mental Status: The patient is alert and oriented to person, place, and time.      Gait: Gait normal.   Psychiatric:         Mood and Affect: Mood normal.         Speech: Speech normal.         Behavior: Behavior normal.         Thought Content: Thought content normal.     Result Review :   The following data was reviewed by: ARIAN Liu on 04/27/2022:    CBC w/diff    CBC w/Diff 2/25/22   WBC 6.45   RBC 5.00   Hemoglobin 15.2   Hematocrit 44.5   MCV 89.0   MCH 30.4   MCHC 34.2   RDW 12.3   Platelets 299           CMP    CMP 8/5/21 2/25/22   Creatinine 1.30    Albumin  4.60   Total Bilirubin  0.4   Alkaline Phosphatase  35 (A)   AST (SGOT)  13   ALT (SGPT)  25   (A) Abnormal value       Comments are available for some flowsheets but are not being displayed.                         Assessment and Plan    Diagnoses and all orders for this visit:    1. Irritable bowel syndrome with diarrhea (Primary)    2. History of colon polyps    3. Fatty liver    4. " Diverticulosis            Follow Up   Return in about 4 months (around 8/27/2022).   Try Florajen 3 once Xifaxin finished  Cont Levsin PRN  May increase Fiber BID to see if helps w bulking of stool.    Patient was given instructions and counseling regarding her condition or for health maintenance advice. Please see specific information pulled into the AVS if appropriate.

## 2022-05-18 ENCOUNTER — TELEPHONE (OUTPATIENT)
Dept: GASTROENTEROLOGY | Facility: CLINIC | Age: 67
End: 2022-05-18

## 2022-05-18 NOTE — TELEPHONE ENCOUNTER
I recommend Florajen3 for probiotic, this is an over-the-counter probiotic but is kept refrigerated and needs to be requested at the pharmacy counter.  Is patient having diarrhea or just blood? Xifaxan only indicated for diarrhea w abd bloating type sx, I am not sure why pt is having rectal bleeding and this is abnormal. Is pt straining at all w BM?

## 2022-05-18 NOTE — TELEPHONE ENCOUNTER
Pt called to ask about probiotics as well as inform us that she is having some rectal bleeding and some ABD pain, pt states that TR said she can give more Xifaxan samples but we were out last time, needing to know if she can have more samples

## 2022-05-20 NOTE — TELEPHONE ENCOUNTER
I called pt to check on symptoms, pt is no longer having rectal bleeding, just the one occurrence. Pt is still having some ABD pain.  Pt is not having diarrhea or constipation.   Pt is going to  probiotics in the next few days, pharmacy had to order some.

## 2022-06-22 ENCOUNTER — TELEPHONE (OUTPATIENT)
Dept: GASTROENTEROLOGY | Facility: CLINIC | Age: 67
End: 2022-06-22

## 2022-06-22 RX ORDER — L.ACIDOPH/B.ANIMALIS/B.LONGUM 15B CELL
1 CAPSULE ORAL DAILY
Qty: 30 CAPSULE | Refills: 3 | Status: SHIPPED | OUTPATIENT
Start: 2022-06-22 | End: 2022-07-22

## 2022-06-22 NOTE — TELEPHONE ENCOUNTER
Pt called asking if we could send in the Florajen 3 that was discussed as a prescription so she could get cheaper through insurance.  Please advise.

## 2022-08-11 NOTE — TELEPHONE ENCOUNTER
Rx Request Levsin 0.125 tablet    Last OV: 3.23.22  Last Rx'd: 5.19.22    Pt has follow up on 8.29.22

## 2022-08-29 ENCOUNTER — OFFICE VISIT (OUTPATIENT)
Dept: GASTROENTEROLOGY | Facility: CLINIC | Age: 67
End: 2022-08-29

## 2022-08-29 VITALS
SYSTOLIC BLOOD PRESSURE: 109 MMHG | BODY MASS INDEX: 39.12 KG/M2 | DIASTOLIC BLOOD PRESSURE: 53 MMHG | HEART RATE: 55 BPM | HEIGHT: 63 IN | WEIGHT: 220.8 LBS

## 2022-08-29 DIAGNOSIS — Z86.010 HISTORY OF COLON POLYPS: ICD-10-CM

## 2022-08-29 DIAGNOSIS — E66.01 CLASS 2 SEVERE OBESITY WITH SERIOUS COMORBIDITY AND BODY MASS INDEX (BMI) OF 39.0 TO 39.9 IN ADULT, UNSPECIFIED OBESITY TYPE: ICD-10-CM

## 2022-08-29 DIAGNOSIS — R19.7 DIARRHEA, UNSPECIFIED TYPE: Primary | ICD-10-CM

## 2022-08-29 DIAGNOSIS — K58.0 IRRITABLE BOWEL SYNDROME WITH DIARRHEA: ICD-10-CM

## 2022-08-29 DIAGNOSIS — K76.0 FATTY LIVER: ICD-10-CM

## 2022-08-29 PROCEDURE — 99214 OFFICE O/P EST MOD 30 MIN: CPT | Performed by: NURSE PRACTITIONER

## 2022-08-29 RX ORDER — ESCITALOPRAM OXALATE 10 MG/1
TABLET ORAL
COMMUNITY
Start: 2022-06-16

## 2022-08-29 RX ORDER — TRAZODONE HYDROCHLORIDE 50 MG/1
TABLET ORAL
COMMUNITY
Start: 2022-08-16

## 2022-08-29 NOTE — PROGRESS NOTES
Patient Name: Aubrie Sanon   Visit Date: 08/29/2022   Patient ID: [id number]  Provider: ARIAN Liu    Sex: female  Location:  Location Address:  Location Phone: [address] [address] [phone]    YOB: 1955  Age: 66 y.o.   Primary Care Provider Melani Bae MD      Referring Provider: No ref. provider found        Chief Complaint  Irritable Bowel Syndrome (With diarrhea, pt states is with every BM) and Abdominal Pain (Pt states is occ. Pt states LLQ and center lower ABD)    History of Present Illness    Patient initially presented February 2022 with complaint of diarrhea for 10 years, reported she cannot eat out eat salads, or chocolate.  Fecal incontinence occurs related to fecal urgency, 7-10 stools per day, no weight loss no nocturnal stools.  CT the abdomen and pelvis with and without contrast 8/5/2021: Fatty liver noted, report also states liver and spleen are normal in size and contour, no acute abnormalities noted diverticulosis     2/25/2022: Liver enzymes within normal limits, INR 1.0, CBC unremarkable     2/25/2022: Stools negative for occult blood, parasite, culture, lactoferrin, C. difficile-Colestid was sent in however patient called later and reported it was not helpful she was taking 2 at night, advised her she could try to twice a day and sent in hyoscyamine, she also reported Colestid caused a headache; she called back reporting hyoscyamine was not helpful so Xifaxan was prescribed 3/16/22 - pt could not afford and samples were given 3/23/22 at last visit.      Colonoscopy 3/1/2022: 20 mm adenomatous and hyperplastic polyp at the hepatic flexure completely removed, 2 small adenomatous polyps in the rectosigmoid colon 5 to 10 mm, diverticula in the sigmoid-3-year colon recall    Patient was last seen 4/27/2022 and reported Xifaxan helped and was having 2-3 stools per day, had increased Levsin dose to 2 before meals and this also helped with  abdominal pain and diarrhea and was having more formed stools.  Patient was also under stress at last visit reporting her son passed away unexpectedly the month prior.  Recommended finishing Xifaxan, patient was currently on Xifaxan samples, and then try Florajen3.    Pt is having 5 stools/day, postprandial, had FI w urgency, states has occurred daily in the last week. Denies floating/oily stools.   States Xifaxan helped sx but they have returned. Pt does feel Xifaxan caused some abd pain, now resolved. Has taken Florajen randomly, trouble remembering to take. Rarely has nocturnal diarrhea, states 1-2 x since last visit. Doesn't feel like Hyoscyamine is helping.   Has taken Imodium also 2-3/d and did not help  Weight stable    Past Medical History:   Diagnosis Date   • Anxiety    • Arthritis     OSTEOPOROSIS LEFT HIP   • Asthma    • COPD (chronic obstructive pulmonary disease) ([facility])     EMPHYSEMA- INHALERS PRN   • Depression    • Diabetes (HCC)     DOESN'T CHECK BG OFTEN AT HOME   • Heart murmur     ASYMPTOMATIC. DENIES CP/SOB, SEE'S PCP   • Hematuria CURRENTLY   • Hypertension        Past Surgical History:   Procedure Laterality Date   • BLADDER REPAIR     • COLONOSCOPY     • COLONOSCOPY N/A 3/1/2022    Procedure: COLONOSCOPY,  with polypectomy;  Surgeon: Domenico Suarez MD;  Location: [facility] ENDOSCOPY;  Service: Gastroenterology;  Laterality: N/A;  colon polyps and diverticulosis   • CYSTOSCOPY     • CYSTOSCOPY BLADDER BIOPSY N/A 11/2/2021    Procedure: CYSTOSCOPY BLADDER BIOPSY WITH FULGURATION;  Surgeon: Fadumo Silva MD;  Location: [facility] MAIN OR;  Service: Urology;  Laterality: N/A;   • HYSTERECTOMY      PARTIAL HYST- RETROCELE, PROLAPSED UTERUS REPAIR   • JOINT REPLACEMENT      RIGHT TKR   • KNEE SURGERY Right     RIGHT TKR   • OTHER SURGICAL HISTORY      RECTOCELE REPAIR   • UPPER GASTROINTESTINAL ENDOSCOPY  12/30/2015    Dr. Mark Miguel       Allergies   Allergen Reactions   • Carisoprodol  "Hives   • Contrast Dye Hives and Itching       Family History   Problem Relation Age of Onset   • Cancer Brother    • Colon cancer Paternal Grandmother 80   • Cancer Paternal Grandmother    • Malig Hyperthermia Neg Hx         Social History     Tobacco Use   • Smoking status: Former Smoker     Years: 1.00     Types: Cigarettes     Quit date: 2000     Years since quittin.5   • Smokeless tobacco: Never Used   Vaping Use   • Vaping Use: Never used   Substance Use Topics   • Alcohol use: Never   • Drug use: Never       Objective     Vital Signs:   /53 (BP Location: Left arm, Patient Position: Sitting, Cuff Size: Adult)   Pulse 55   Ht 160 cm (62.99\")   Wt 100 kg (220 lb 12.8 oz)   BMI 39.13 kg/m²       Physical Exam  Constitutional:       General: The patient is not in acute distress.     Appearance: Normal appearance.   HENT:      Head: Normocephalic and atraumatic.      Nose: Nose normal.   Pulmonary:      Effort: Pulmonary effort is normal. No respiratory distress.   Abdominal:      General: Abdomen is flat.      Palpations: Abdomen is soft. There is no mass.      Tenderness: There is no abdominal tenderness. There is no guarding.   Musculoskeletal:      Cervical back: Neck supple.      Right lower leg: No edema.      Left lower leg: No edema.   Skin:     General: Skin is warm and dry.   Neurological:      General: No focal deficit present.      Mental Status: The patient is alert and oriented to person, place, and time.      Gait: Gait normal.   Psychiatric:         Mood and Affect: Mood normal.         Speech: Speech normal.         Behavior: Behavior normal.         Thought Content: Thought content normal.     Result Review :   The following data was reviewed by: ARIAN Liu on 2022:    CBC w/diff    CBC w/Diff 22   WBC 6.45   RBC 5.00   Hemoglobin 15.2   Hematocrit 44.5   MCV 89.0   MCH 30.4   MCHC 34.2   RDW 12.3   Platelets 299           CMP    CMP 22 "   Albumin 4.60   Total Bilirubin 0.4   Alkaline Phosphatase 35 (A)   AST (SGOT) 13   ALT (SGPT) 25   (A) Abnormal value                          Assessment and Plan    Diagnoses and all orders for this visit:    1. Diarrhea, unspecified type (Primary)  -     Celiac Disease Panel; Future  -     Pancreatic Elastase, Fecal - Stool, Per Rectum; Future  -     Fecal Fat, Qualitative - Stool, Per Rectum; Future    2. Fatty liver    3. Irritable bowel syndrome with diarrhea    4. History of colon polyps    5. Class 2 severe obesity with serious comorbidity and body mass index (BMI) of 39.0 to 39.9 in adult, unspecified obesity type (HCC)            Follow Up   Return for next available w Dr Suarez.   Collect stools  Pt may try bismuth x 2 weeks   Check celiac  Pt admits struggling w loss of her son as well. Offered counseling referral, pt declined.   Encouraged cont wt loss for fatty liver.     Patient was given instructions and counseling regarding her condition or for health maintenance advice. Please see specific information pulled into the AVS if appropriate.

## 2022-11-16 ENCOUNTER — TELEPHONE (OUTPATIENT)
Dept: GASTROENTEROLOGY | Facility: CLINIC | Age: 67
End: 2022-11-16

## 2022-11-16 NOTE — TELEPHONE ENCOUNTER
REC'VD PA REQUEST FOR XIFAXAN   -RX DATE 4/15/22  REVIEW OF CHART NOTES PT HAS NOT TAKEN - PT HAS UPCOMING APPT WITH TR - AND CAN BE DISCUSSED DURING VISIT

## 2022-12-19 ENCOUNTER — TELEPHONE (OUTPATIENT)
Dept: GASTROENTEROLOGY | Facility: CLINIC | Age: 67
End: 2022-12-19
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
## 2023-01-25 ENCOUNTER — TELEPHONE (OUTPATIENT)
Dept: GASTROENTEROLOGY | Facility: CLINIC | Age: 68
End: 2023-01-25
Payer: MEDICARE
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
## 2023-02-03 ENCOUNTER — TELEPHONE (OUTPATIENT)
Dept: GASTROENTEROLOGY | Facility: CLINIC | Age: 68
End: 2023-02-03
Payer: MEDICARE

## 2023-02-03 NOTE — TELEPHONE ENCOUNTER
Left message with patient asking for a returned call to follow up on overdue results for laboratory tests.
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
## 2024-09-06 ENCOUNTER — OFFICE VISIT (OUTPATIENT)
Dept: UROLOGY | Facility: CLINIC | Age: 69
End: 2024-09-06
Payer: MEDICARE

## 2024-09-06 VITALS
HEIGHT: 63 IN | TEMPERATURE: 98.2 F | DIASTOLIC BLOOD PRESSURE: 72 MMHG | BODY MASS INDEX: 35.79 KG/M2 | WEIGHT: 202 LBS | HEART RATE: 67 BPM | SYSTOLIC BLOOD PRESSURE: 138 MMHG

## 2024-09-06 DIAGNOSIS — N81.10 PELVIC RELAXATION DUE TO VAGINAL PROLAPSE: ICD-10-CM

## 2024-09-06 DIAGNOSIS — N36.8 PROLAPSE URETHRAL MUCOSA: ICD-10-CM

## 2024-09-06 DIAGNOSIS — N81.10 PROLAPSE OF ANTERIOR VAGINAL WALL: ICD-10-CM

## 2024-09-06 DIAGNOSIS — R31.9 HEMATURIA, UNSPECIFIED TYPE: ICD-10-CM

## 2024-09-06 DIAGNOSIS — Z87.440 HISTORY OF RECURRENT UTIS: ICD-10-CM

## 2024-09-06 DIAGNOSIS — R30.0 DYSURIA: ICD-10-CM

## 2024-09-06 DIAGNOSIS — N32.89 BLADDER WALL THICKENING: Primary | ICD-10-CM

## 2024-09-06 DIAGNOSIS — Z90.710 HISTORY OF HYSTERECTOMY: ICD-10-CM

## 2024-09-06 DIAGNOSIS — Z98.890 HISTORY OF BLADDER REPAIR SURGERY: ICD-10-CM

## 2024-09-06 LAB
BILIRUB BLD-MCNC: NEGATIVE MG/DL
CLARITY, POC: ABNORMAL
COLOR UR: YELLOW
EXPIRATION DATE: ABNORMAL
GLUCOSE UR STRIP-MCNC: NEGATIVE MG/DL
KETONES UR QL: NEGATIVE
LEUKOCYTE EST, POC: ABNORMAL
Lab: ABNORMAL
NITRITE UR-MCNC: NEGATIVE MG/ML
PH UR: 5.5 [PH] (ref 5–8)
PROT UR STRIP-MCNC: ABNORMAL MG/DL
RBC # UR STRIP: ABNORMAL /UL
SP GR UR: 1.03 (ref 1–1.03)
UROBILINOGEN UR QL: ABNORMAL

## 2024-09-06 PROCEDURE — 87186 SC STD MICRODIL/AGAR DIL: CPT | Performed by: NURSE PRACTITIONER

## 2024-09-06 PROCEDURE — 88108 CYTOPATH CONCENTRATE TECH: CPT | Performed by: NURSE PRACTITIONER

## 2024-09-06 PROCEDURE — 87086 URINE CULTURE/COLONY COUNT: CPT | Performed by: NURSE PRACTITIONER

## 2024-09-06 PROCEDURE — 87077 CULTURE AEROBIC IDENTIFY: CPT | Performed by: NURSE PRACTITIONER

## 2024-09-06 RX ORDER — SEMAGLUTIDE 0.68 MG/ML
INJECTION, SOLUTION SUBCUTANEOUS
COMMUNITY
Start: 2024-06-10 | End: 2024-09-06

## 2024-09-06 RX ORDER — CIPROFLOXACIN 500 MG/1
TABLET, FILM COATED ORAL
COMMUNITY
Start: 2024-07-30 | End: 2024-09-06

## 2024-09-06 RX ORDER — INSULIN LISPRO 100 [IU]/ML
INJECTION, SOLUTION INTRAVENOUS; SUBCUTANEOUS
COMMUNITY

## 2024-09-06 RX ORDER — NITROFURANTOIN 25; 75 MG/1; MG/1
100 CAPSULE ORAL
COMMUNITY
Start: 2024-07-15 | End: 2024-09-06

## 2024-09-06 RX ORDER — CLINDAMYCIN PHOSPHATE 20 MG/G
1 CREAM VAGINAL NIGHTLY
Qty: 7 G | Refills: 0 | Status: SHIPPED | OUTPATIENT
Start: 2024-09-06 | End: 2024-09-13

## 2024-09-06 RX ORDER — TRAZODONE HYDROCHLORIDE 150 MG/1
TABLET ORAL
COMMUNITY
Start: 2024-06-13

## 2024-09-06 RX ORDER — CLONAZEPAM 1 MG/1
1 TABLET ORAL 2 TIMES DAILY
COMMUNITY
Start: 2024-05-10

## 2024-09-06 RX ORDER — TRAMADOL HYDROCHLORIDE 50 MG/1
50 TABLET ORAL
COMMUNITY
Start: 2024-07-30 | End: 2024-09-06

## 2024-09-06 RX ORDER — CYANOCOBALAMIN 1000 UG/ML
1000 INJECTION, SOLUTION INTRAMUSCULAR; SUBCUTANEOUS
COMMUNITY
End: 2024-09-06

## 2024-09-06 RX ORDER — SULFAMETHOXAZOLE/TRIMETHOPRIM 800-160 MG
TABLET ORAL
COMMUNITY
Start: 2024-07-01 | End: 2024-09-06

## 2024-09-06 RX ORDER — MELOXICAM 7.5 MG/1
7.5 TABLET ORAL DAILY
COMMUNITY
Start: 2024-04-25 | End: 2024-09-06

## 2024-09-06 NOTE — PROGRESS NOTES
Chief Complaint  Urinary Tract Infection (NEW PATIENT-has had burning with urination for about a month, has tried antibiotics and is not helping.) and Urinary Incontinence (Has had 2 bladder tacts- feels like her bladder has fallen)    Subjective          Aubrie Sanon is a pleasant  68 y.o.  female who presents to [facility] UROLOGY  History of Present Illness  Referral per primary care provider ARIAN Valdivia for evaluation of hematuria and UTI. Patient reports had been doing relatively well until a little over 1-2 months ago. Having burning with urination and at times sharp pain at end of voiding. On 4 different antibiotics per pcp but continues to have the same sensation and discomfort. Pain specifically region of introitus stating felt as if bladder fallen and rubbing her clothing. Began having some urinary incontinence and wearing a pad.  She has not been sexually active for years. . Has been having diarrhea lately. No specific abdominal pains. Did see some blood during urination and blood on tissue when wiped 2 weeks ago.  Patient has been established with [facility] in the past and  evaluated per Dr Silva and ARIAN Casillas .  Patient with history of gross hematuria and past smoking history.  Last cystoscopy and bladder biopsy 2021 per Dr. Silva.  Pathology indicating chronic cystitis.  Patient has had hysterectomy around 1991 along with x2  bladder repairs and rectocele repair x1.  Last bladder repair reported approximately 15 years ago per Dr Jin Mcbride. Last dose of antibiotic estimated several weeks ago. No recent urine culture results provided.     CT abdomen pelvis dated 7/30/2024 :  Kidneys, ureters and urinary bladder:   No nephrolithiasis. Left renal superior pole focal parenchymal   calcification. No hydronephrosis. Mild diffuse urinary bladder wall   thickening with minimal surrounding inflammatory fat-stranding,   suggestive of  "cystitis     Noted right ovarian cyst. Patient reports that she has appointment with OB/GYN [place] next week.  Patient arrival for visit delayed and only able to provide 2 drops of urine and finally able to provide prior to leaving office.   Due to blood with wiping and past history of smoking, repeat cystoscopy needed.        Objective   Vital Signs:   /72 (BP Location: Left arm, Patient Position: Sitting, Cuff Size: Adult)   Pulse 67   Temp 98.2 °F (36.8 °C) (Temporal)   Ht 160 cm (62.99\")   Wt 91.6 kg (202 lb)   BMI 35.79 kg/m²     Past Medical History:   Diagnosis Date    Anxiety     Arthritis     OSTEOPOROSIS LEFT HIP    Asthma     COPD (chronic obstructive pulmonary disease)     EMPHYSEMA- INHALERS PRN    Depression     Diabetes     DOESN'T CHECK BG OFTEN AT HOME    Heart murmur     ASYMPTOMATIC. DENIES CP/SOB, SEE'S PCP    Hematuria CURRENTLY    Hypertension      Past Surgical History:   Procedure Laterality Date    BLADDER REPAIR      COLONOSCOPY      COLONOSCOPY N/A 3/1/2022    Procedure: COLONOSCOPY,  with polypectomy;  Surgeon: Domenico Suarez MD;  Location: [facility] ENDOSCOPY;  Service: Gastroenterology;  Laterality: N/A;  colon polyps and diverticulosis    CYSTOSCOPY      CYSTOSCOPY BLADDER BIOPSY N/A 11/2/2021    Procedure: CYSTOSCOPY BLADDER BIOPSY WITH FULGURATION;  Surgeon: Fadumo Silva MD;  Location: [facility] MAIN OR;  Service: Urology;  Laterality: N/A;    HYSTERECTOMY      PARTIAL HYST- RETROCELE, PROLAPSED UTERUS REPAIR    JOINT REPLACEMENT      RIGHT TKR    KNEE SURGERY Right     RIGHT TKR    OTHER SURGICAL HISTORY      RECTOCELE REPAIR    UPPER GASTROINTESTINAL ENDOSCOPY  12/30/2015    Dr. Mark Miguel     Family History   Problem Relation Age of Onset    Cancer Brother     Colon cancer Paternal Grandmother 80    Cancer Paternal Grandmother     Malig Hyperthermia Neg Hx      Social History     Socioeconomic History    Marital status:    Tobacco Use    Smoking " status: Former     Current packs/day: 0.00     Types: Cigarettes     Start date: 1999     Quit date: 2000     Years since quittin.5     Passive exposure: Past    Smokeless tobacco: Never   Vaping Use    Vaping status: Never Used   Substance and Sexual Activity    Alcohol use: Never    Drug use: Never    Sexual activity: Defer     Allergies   Allergen Reactions    Carisoprodol Hives and Unknown (See Comments)    Contrast Dye (Echo Or Unknown Ct/Mr) Hives and Itching    Iodinated Contrast Media Hives and Itching    Other Hives, Itching and Other (See Comments)    Gadolinium Derivatives Other (See Comments)     Current Outpatient Medications   Medication Sig Dispense Refill    aspirin 81 MG chewable tablet Chew 2 tablets Daily. LAST DOSE       buPROPion HBr (APLENZIN PO)       buPROPion XL (WELLBUTRIN XL) 150 MG 24 hr tablet Take 1 tablet by mouth Every Morning.      cholecalciferol (VITAMIN D3) 25 MCG (1000 UT) tablet Take 3 tablets by mouth Daily.      clonazePAM (KlonoPIN) 1 MG tablet Take 1 tablet by mouth 2 (Two) Times a Day.      glucose blood test strip 1 each by Other route.      Insulin Lispro (humaLOG) 100 UNIT/ML injection Inject  under the skin into the appropriate area as directed.      isosorbide mononitrate (IMDUR) 30 MG 24 hr tablet Take 1 tablet by mouth Daily.      lisinopril (PRINIVIL,ZESTRIL) 2.5 MG tablet Take 1 tablet by mouth Daily.      metaxalone (SKELAXIN) 800 MG tablet Take 1 tablet by mouth At Night As Needed for Muscle Spasms. (Patient taking differently: Take 1 tablet by mouth 3 (Three) Times a Day As Needed for Muscle Spasms.) 30 tablet 1    metoprolol succinate XL (TOPROL-XL) 25 MG 24 hr tablet Take 1 tablet by mouth Every Night.      montelukast (SINGULAIR) 10 MG tablet Take 1 tablet by mouth Daily.  2    simvastatin (ZOCOR) 40 MG tablet Take 1 tablet by mouth Every Night. TK 1 T PO HS.  0    traZODone (DESYREL) 150 MG tablet TAKE 1 TABLET NIGHTLY       DISCONTINUE DOXEPIN      VENTOLIN  (90 BASE) MCG/ACT inhaler Inhale 1 puff As Needed.  1    clindamycin (CLEOCIN) 2 % vaginal cream Insert 1 applicator into the vagina Every Night for 7 days. 7 g 0     No current facility-administered medications for this visit.         Review of Systems   Constitutional:  Negative for chills and fever.   Gastrointestinal:  Positive for diarrhea.   Genitourinary:  Positive for dysuria and hematuria.        Physical Exam  Vitals and nursing note reviewed.   Constitutional:       General: She is not in acute distress.     Appearance: Normal appearance. She is well-developed. She is not ill-appearing.      Comments: Ambulates without difficulty   Cardiovascular:      Rate and Rhythm: Normal rate.   Pulmonary:      Effort: Pulmonary effort is normal.      Breath sounds: Normal air entry.   Abdominal:      Tenderness: There is no abdominal tenderness. There is no guarding or rebound.   Genitourinary:     Comments: See assessment   Musculoskeletal:         General: Normal range of motion.   Skin:     General: Skin is warm and dry.   Neurological:      Mental Status: She is alert and oriented to person, place, and time.      Motor: Motor function is intact.   Psychiatric:         Mood and Affect: Mood normal.         Behavior: Behavior normal. Behavior is cooperative.         Thought Content: Thought content normal.         Judgment: Judgment normal.        Result Review :           UA          9/6/2024    14:06   Urinalysis   Ketones, UA Negative    Leukocytes, UA Small (1+)           Results for orders placed or performed in visit on 09/06/24   POC Urinalysis Dipstick, Automated    Specimen: Urine   Result Value Ref Range    Color Yellow (A) Yellow, Straw, Dark Yellow, Connie    Clarity, UA Cloudy (A) Clear    Specific Gravity  1.030 1.005 - 1.030    pH, Urine 5.5 5.0 - 8.0    Leukocytes Small (1+) (A) Negative    Nitrite, UA Negative Negative    Protein,  mg/dL (A) Negative  mg/dL    Glucose, UA Negative Negative mg/dL    Ketones, UA Negative Negative    Urobilinogen, UA 0.2 E.U./dL Normal, 0.2 E.U./dL    Bilirubin Negative Negative    Blood, UA Moderate (A) Negative    Lot Number [id number]     Expiration Date 07/31/2025    Telephone with Fadumo Silva MD (09/07/2021)     Office Visit with Fadumo Silva MD (08/06/2021)    Office Visit with Fadumo Silva MD (11/10/2021)   Tissue Pathology Exam (11/02/2021 09:13)   CT Abdomen Pelvis Without Contrast (07/30/2024 13:23)   US abdomen limited (05/01/2024 10:08)       Assessment and Plan    Diagnoses and all orders for this visit:    1. Bladder wall thickening (Primary)  -     Ambulatory Referral to Urology    2. History of recurrent UTIs  -     POC Urinalysis Dipstick, Automated  -     Urine Culture - Urine, Urine, Clean Catch    3. Hematuria, unspecified type  -     POC Urinalysis Dipstick, Automated  -     Cytology, Urine; Future  -     Cytology, Urine  -     Ambulatory Referral to Urology    4. Pelvic relaxation due to vaginal prolapse    5. Prolapse urethral mucosa    6. Dysuria  -     Cytology, Urine; Future  -     Urine Culture - Urine, Urine, Clean Catch  -     clindamycin (CLEOCIN) 2 % vaginal cream; Insert 1 applicator into the vagina Every Night for 7 days.  Dispense: 7 g; Refill: 0  -     Cytology, Urine    7. History of bladder repair surgery    8. History of hysterectomy    9. Prolapse of anterior vaginal wall      Discussed with patient prolapse of urethral mucosa.  This could be causing discomfort with and difficulty voiding.  Patient has appointment with OB/GYN next week for evaluation of ovarian cyst and recommended patient discuss with provider possible vaginal estrogen which often helps with that particular situation.  Due to ovarian cyst that would need to be addressed prior to estrogen therapy.     Patient does need cystoscopy.  Offered to perform under Dr. Rose in office however patient declines and would  prefer to have performed by female provider.  Dr. iSlva has performed cystoscopy in the past and patient would like referral back.  She would prefer being seen in [place] for  regular follow up visits if possible since that is where she lives.  I will provide clindamycin vaginal antibiotic for probable vaginosis and send voided urine sample for culture prior to oral antibiotics.     Relayed to patient that the dysuria and pain at end of void also may be related to the prolapse of the urethral mucosa since inflamed tissue. Advised consuming increase water.      Did relay to patient possible vaginal contamination and may wish to repeat in future with In-N-Out cath if questionable culture results.Urine sent for cytology.     Bladder neck and previous bladder sling in appropriate location. Anterior and posterior vaginal wall prolapsing and pelvic floor relaxation.     Provided with samples of Ellura cran caps daily for uti prevention. Will place referral so patient may [facility] for cystoscopy and continuation of care per patient requests female provider. Patient may call if problems prior to cystoscopy.        Follow Up   Return for referral urology [facility] for cysto.  Patient was given instructions and counseling regarding her condition or for health maintenance advice. Please see specific information pulled into the AVS if appropriate.     ARIAN Fraga

## 2024-09-08 LAB — BACTERIA SPEC AEROBE CULT: ABNORMAL

## 2024-09-09 DIAGNOSIS — N30.90 KLEBSIELLA CYSTITIS: Primary | ICD-10-CM

## 2024-09-09 DIAGNOSIS — B96.1 KLEBSIELLA CYSTITIS: Primary | ICD-10-CM

## 2024-09-09 NOTE — PROGRESS NOTES
Spoke with patient via telephone regarding urine culture results. Klebseilla present. To continue clindamycin vaginal antibiotic almost completed and begin keflex oral 250mg 4 times a day for 7 days then 1 nightly until seen visit cystoscopy Urology [facility] [address] OCT 23. Renal function taken into dosing consideration. If develops fever at any time, to call.

## 2024-09-10 LAB
CYTO UR: NORMAL
LAB AP CASE REPORT: NORMAL
LAB AP CLINICAL INFORMATION: NORMAL
LAB AP NON-GYN INTERPRETATION: NORMAL
PATH REPORT.FINAL DX SPEC: NORMAL
PATH REPORT.GROSS SPEC: NORMAL

## 2024-10-23 ENCOUNTER — PROCEDURE VISIT (OUTPATIENT)
Dept: UROLOGY | Facility: CLINIC | Age: 69
End: 2024-10-23
Payer: MEDICARE

## 2024-10-23 ENCOUNTER — PREP FOR SURGERY (OUTPATIENT)
Dept: OTHER | Facility: HOSPITAL | Age: 69
End: 2024-10-23
Payer: MEDICARE

## 2024-10-23 VITALS
HEIGHT: 63 IN | WEIGHT: 207 LBS | DIASTOLIC BLOOD PRESSURE: 64 MMHG | BODY MASS INDEX: 36.68 KG/M2 | SYSTOLIC BLOOD PRESSURE: 130 MMHG

## 2024-10-23 DIAGNOSIS — R31.9 HEMATURIA, UNSPECIFIED TYPE: ICD-10-CM

## 2024-10-23 DIAGNOSIS — N32.89 BLADDER WALL THICKENING: Primary | ICD-10-CM

## 2024-10-23 DIAGNOSIS — R30.0 DYSURIA: ICD-10-CM

## 2024-10-23 RX ORDER — SODIUM CHLORIDE 0.9 % (FLUSH) 0.9 %
10 SYRINGE (ML) INJECTION EVERY 12 HOURS SCHEDULED
OUTPATIENT
Start: 2024-10-23

## 2024-10-23 RX ORDER — SODIUM CHLORIDE 0.9 % (FLUSH) 0.9 %
10 SYRINGE (ML) INJECTION AS NEEDED
OUTPATIENT
Start: 2024-10-23

## 2024-10-23 RX ORDER — ESTRADIOL 0.1 MG/G
CREAM VAGINAL
COMMUNITY
Start: 2024-09-12

## 2024-10-23 NOTE — PROGRESS NOTES
Cystoscopy    Date/Time: 10/23/2024 2:25 PM    Performed by: Fadumo Silva MD  Authorized by: Fadumo Silva MD  Preparation: Patient was prepped and draped in the usual sterile fashion.  Local anesthesia used: no    Anesthesia:  Local anesthesia used: no    Sedation:  Patient sedated: no    Patient tolerance: patient tolerated the procedure well with no immediate complications        Cytoscopy Procedure:     Procedure: Flexible cytoscope was passed per urethra into the bladder without difficulty after proper consent. The bladder was inspected in a systematic meridian fashion. There were several areas of intense inflammation around the bladder almost consistent with bladder ulcers.  No obvious bladder tumors were seen.  Both ureteral orifices were identified and were normal in appearance. The flexible cytoscope was removed. The patient tolerated the procedure well.       She will need a cystoscopy with bladder biopsy and fulguration in the operating.  Risk benefits discussed and she is agreeable to proceed.

## 2024-10-23 NOTE — H&P (VIEW-ONLY)
[facility]   Urology HISTORY AND PHYSICAL    Patient Name: Aubrie Sanon  : 1955  MRN: [id number]  Primary Care Physician:  Kendell Yanez, ARIAN  Date of admission: (Not on file)    Subjective   Subjective       History of Present Illness  Patient has bladder ulcers and presents for cystoscopy, bladder biopsy and fulguration      Personal History     Past Medical History:   Diagnosis Date    Anxiety     Arthritis     OSTEOPOROSIS LEFT HIP    Asthma     COPD (chronic obstructive pulmonary disease)     EMPHYSEMA- INHALERS PRN    Depression     Diabetes     DOESN'T CHECK BG OFTEN AT HOME    Heart murmur     ASYMPTOMATIC. DENIES CP/SOB, SEE'S PCP    Hematuria CURRENTLY    Hypertension        Past Surgical History:   Procedure Laterality Date    BLADDER REPAIR      COLONOSCOPY      COLONOSCOPY N/A 3/1/2022    Procedure: COLONOSCOPY,  with polypectomy;  Surgeon: Domenico Suarez MD;  Location: [facility] ENDOSCOPY;  Service: Gastroenterology;  Laterality: N/A;  colon polyps and diverticulosis    CYSTOSCOPY      CYSTOSCOPY BLADDER BIOPSY N/A 2021    Procedure: CYSTOSCOPY BLADDER BIOPSY WITH FULGURATION;  Surgeon: Fadumo Silva MD;  Location: [facility] MAIN OR;  Service: Urology;  Laterality: N/A;    HYSTERECTOMY      PARTIAL HYST- RETROCELE, PROLAPSED UTERUS REPAIR    JOINT REPLACEMENT      RIGHT TKR    KNEE SURGERY Right     RIGHT TKR    OTHER SURGICAL HISTORY      RECTOCELE REPAIR    UPPER GASTROINTESTINAL ENDOSCOPY  2015    Dr. Mark Miguel       Family History: family history includes Cancer in her brother and paternal grandmother; Colon cancer (age of onset: 80) in her paternal grandmother. Otherwise pertinent FHx was reviewed and not pertinent to current issue.    Social History:  reports that she quit smoking about 24 years ago. Her smoking use included cigarettes. She started smoking about 25 years ago. She has been exposed to tobacco smoke. She has never used smokeless tobacco.  She reports that she does not drink alcohol and does not use drugs.    Home Medications:  Insulin Lispro, albuterol sulfate HFA, aspirin, buPROPion HBr, buPROPion XL, cephalexin, cholecalciferol, clonazePAM, estradiol, glucose blood, isosorbide mononitrate, lisinopril, metaxalone, metoprolol succinate XL, montelukast, simvastatin, and traZODone    Allergies:  Allergies   Allergen Reactions    Carisoprodol Hives and Unknown (See Comments)    Contrast Dye (Echo Or Unknown Ct/Mr) Hives and Itching    Iodinated Contrast Media Hives and Itching    Other Hives, Itching and Other (See Comments)    Gadolinium Derivatives Other (See Comments)       Objective    Objective     Vitals:   BP: (130)/(64) 130/64    Physical Exam  Constitutional:       Appearance: Normal appearance.   Cardiovascular:      Rate and Rhythm: Normal rate and regular rhythm.   Pulmonary:      Effort: Pulmonary effort is normal.      Breath sounds: Normal breath sounds.   Neurological:      Mental Status: She is alert. Mental status is at baseline.   Psychiatric:         Mood and Affect: Mood and affect normal.         Speech: Speech normal.         Judgment: Judgment normal.         Result Review    Result Review:  I have personally reviewed the results from the time of this admission to 10/23/2024 14:26 EDT and agree with these findings:  [x]  Laboratory  []  Microbiology  [x]  Radiology  []  EKG/Telemetry   []  Cardiology/Vascular   []  Pathology  [x]  Old records  []  Other:      Assessment & Plan   Assessment / Plan       Active Hospital Problems:  There are no active hospital problems to display for this patient.      Plan: cystoscopy, bladder biopsy and fulguration  Risks and benefits discussed with patient and they are agreeable to proceed.    VTE Prophylaxis:  No VTE prophylaxis order currently exists.        CODE STATUS:           Electronically signed by Fadumo Silva MD, 10/23/24, 2:26 PM EDT.

## 2024-10-23 NOTE — H&P
[facility]   Urology HISTORY AND PHYSICAL    Patient Name: Aubrie Sanon  : 1955  MRN: [id number]  Primary Care Physician:  Kendell Yanez, ARIAN  Date of admission: (Not on file)    Subjective   Subjective       History of Present Illness  Patient has bladder ulcers and presents for cystoscopy, bladder biopsy and fulguration      Personal History     Past Medical History:   Diagnosis Date    Anxiety     Arthritis     OSTEOPOROSIS LEFT HIP    Asthma     COPD (chronic obstructive pulmonary disease)     EMPHYSEMA- INHALERS PRN    Depression     Diabetes     DOESN'T CHECK BG OFTEN AT HOME    Heart murmur     ASYMPTOMATIC. DENIES CP/SOB, SEE'S PCP    Hematuria CURRENTLY    Hypertension        Past Surgical History:   Procedure Laterality Date    BLADDER REPAIR      COLONOSCOPY      COLONOSCOPY N/A 3/1/2022    Procedure: COLONOSCOPY,  with polypectomy;  Surgeon: Domenico Suarez MD;  Location: [facility] ENDOSCOPY;  Service: Gastroenterology;  Laterality: N/A;  colon polyps and diverticulosis    CYSTOSCOPY      CYSTOSCOPY BLADDER BIOPSY N/A 2021    Procedure: CYSTOSCOPY BLADDER BIOPSY WITH FULGURATION;  Surgeon: Fadumo Silva MD;  Location: [facility] MAIN OR;  Service: Urology;  Laterality: N/A;    HYSTERECTOMY      PARTIAL HYST- RETROCELE, PROLAPSED UTERUS REPAIR    JOINT REPLACEMENT      RIGHT TKR    KNEE SURGERY Right     RIGHT TKR    OTHER SURGICAL HISTORY      RECTOCELE REPAIR    UPPER GASTROINTESTINAL ENDOSCOPY  2015    Dr. Mark Miguel       Family History: family history includes Cancer in her brother and paternal grandmother; Colon cancer (age of onset: 80) in her paternal grandmother. Otherwise pertinent FHx was reviewed and not pertinent to current issue.    Social History:  reports that she quit smoking about 24 years ago. Her smoking use included cigarettes. She started smoking about 25 years ago. She has been exposed to tobacco smoke. She has never used smokeless tobacco.  She reports that she does not drink alcohol and does not use drugs.    Home Medications:  Insulin Lispro, albuterol sulfate HFA, aspirin, buPROPion HBr, buPROPion XL, cephalexin, cholecalciferol, clonazePAM, estradiol, glucose blood, isosorbide mononitrate, lisinopril, metaxalone, metoprolol succinate XL, montelukast, simvastatin, and traZODone    Allergies:  Allergies   Allergen Reactions    Carisoprodol Hives and Unknown (See Comments)    Contrast Dye (Echo Or Unknown Ct/Mr) Hives and Itching    Iodinated Contrast Media Hives and Itching    Other Hives, Itching and Other (See Comments)    Gadolinium Derivatives Other (See Comments)       Objective    Objective     Vitals:   BP: (130)/(64) 130/64    Physical Exam  Constitutional:       Appearance: Normal appearance.   Cardiovascular:      Rate and Rhythm: Normal rate and regular rhythm.   Pulmonary:      Effort: Pulmonary effort is normal.      Breath sounds: Normal breath sounds.   Neurological:      Mental Status: She is alert. Mental status is at baseline.   Psychiatric:         Mood and Affect: Mood and affect normal.         Speech: Speech normal.         Judgment: Judgment normal.         Result Review    Result Review:  I have personally reviewed the results from the time of this admission to 10/23/2024 14:26 EDT and agree with these findings:  [x]  Laboratory  []  Microbiology  [x]  Radiology  []  EKG/Telemetry   []  Cardiology/Vascular   []  Pathology  [x]  Old records  []  Other:      Assessment & Plan   Assessment / Plan       Active Hospital Problems:  There are no active hospital problems to display for this patient.      Plan: cystoscopy, bladder biopsy and fulguration  Risks and benefits discussed with patient and they are agreeable to proceed.    VTE Prophylaxis:  No VTE prophylaxis order currently exists.        CODE STATUS:           Electronically signed by Fadumo Silva MD, 10/23/24, 2:26 PM EDT.

## 2024-10-23 NOTE — PROGRESS NOTES
Instructed patient to hold aspirin for 5 days prior to procedure per Dr. Silva. Patient verbalized understanding.

## 2024-11-04 RX ORDER — INSULIN GLARGINE 100 [IU]/ML
75 INJECTION, SOLUTION SUBCUTANEOUS NIGHTLY
COMMUNITY

## 2024-11-04 NOTE — PRE-PROCEDURE INSTRUCTIONS
PATIENT INSTRUCTED TO BE:    - NOTHING TO EAT AFTER MIDNIGHT OR CHEW, EXCEPT CAN HAVE CLEAR LIQUIDS 2 HOURS PRIOR TO SURGERY ARRIVAL TIME , NO MORE THAN 8 OZ. (NOTHING RED)     - TO HOLD ALL VITAMINS, SUPPLEMENTS, NSAIDS FOR ONE WEEK PRIOR TO THEIR SURGICAL PROCEDURE    - DO NOT TAKE _60-3-40_____ 7 DAYS PRIOR TO PROCEDURE PER ANESTHESIA RECOMMENDATIONS/INSTRUCTIONS     - INSTRUCTED PT TO USE SURGICAL SOAP 1 TIME THE NIGHT PRIOR TO SURGERY __N/A_________ OR THE AM OF SURGERY __N/A___________   USE THE SOAP FROM NECK TO TOES, AVOID THEIR FACE, HAIR, AND PRIVATE PARTS. IF USE THE SOAP THE NIGHT PRIOR TO SURGERY, CHANGE BED LINENS AND NO PETS IN THE BED.     INSTRUCTED NO LOTIONS, JEWELRY, PIERCINGS,  NAIL POLISH, OR DEODORANT DAY OF SURGERY    - IF DIABETIC, CHECK BLOOD GLUCOSE IF LESS THAN 70 OR HAVING SYMPTOMS CALL THE PREOP AREA FOR INSTRUCTIONS ON AM OF SURGERY ([phone] )    -INSTRUCTED TO TAKE THE FOLLOWING MEDICATIONS THE DAY OF SURGERY WITH SIPS OF WATER:      WELLBUTRIN, KLONOPIN, ESTRACE, IMDUR, SKELAXIN IF NEEDED, SINGULAR,  INHALER    TAKE LANTUS AS NORMAL THE NIGHT PRIOR TO SURGERY 11-4-24    ON HUMALOG NONE AFTER DINNER THE NIGHT PRIOR TO SURGERY 11-4-24 AND NONE DAY OF SURGERY 11-5-24 ( LAST DOSE NOON DOSE 11-4-24)    INSTRUCTED PT TO FOLLOW DR NINA'S OFFICE INSTRUCTIONS ON ASPIRIN. PT REPORTED SHE ALREADY STOPPED LAST DOSE WAS 10-31-24    - DO NOT BRING ANY MEDICATIONS WITH YOU TO THE HOSPITAL THE DAY OF SURGERY, EXCEPT IF USE INHALERS. BRING INHALERS DAY OF SURGERY       - BRING CPAP OR BIPAP TO THE HOSPITAL ONLY IF YOU ARE SPENDING THE NIGHT    - DO NOT SMOKE OR VAPE 24 HOURS PRIOR TO PROCEDURE PER ANESTHESIA REQUEST     -MAKE SURE YOU HAVE A RIDE HOME OR SOMEONE TO STAY WITH YOU THE DAY OF THE PROCEDURE AFTER YOU GO HOME     - FOLLOW ANY OTHER INSTRUCTIONS GIVEN TO YOU BY YOUR SURGEON'S OFFICE.     - DAY OF SURGERY __66-3-43__________, COME TO ELEVATOR A, THIRD FLOOR, CHECK IN AT THE DESK FOR  REGISTRATION/SURGERY   - YOU WILL RECEIVE A PHONE CALL THE DAY PRIOR TO SURGERY BETWEEN 1PM AND 4 PM WITH ARRIVAL TIME, IF YOUR SURGERY IS ON A MONDAY YOU WILL RECEIVE A CALL THE FRIDAY PRIOR TO SURGERY DATE    - BRING CASH OR CREDIT CARD FOR COPAYMENT OF MEDICATIONS AFTER SURGERY IF YOU USE THE HOSPITAL PHARMACY (MEDS TO BED)    - PREADMISSION TESTING NURSE DEONDRE MENA -[phone] IF HAVE ANY QUESTIONS     -PATIENT PROVIDED THE NUMBER FOR PREOP SURGICAL DEPT IF HAD QUESTIONS AFTER HOURS PRIOR TO SURGERY ([phone] ).  INFORMED PT IF NO ANSWER, LEAVE A MESSAGE AND SOMEONE WILL RETURN THEIR CALL       PATIENT VERBALIZED UNDERSTANDING

## 2024-11-05 ENCOUNTER — ANESTHESIA (OUTPATIENT)
Dept: PERIOP | Facility: HOSPITAL | Age: 69
End: 2024-11-05
Payer: MEDICARE

## 2024-11-05 ENCOUNTER — ANESTHESIA EVENT (OUTPATIENT)
Dept: PERIOP | Facility: HOSPITAL | Age: 69
End: 2024-11-05
Payer: MEDICARE

## 2024-11-05 ENCOUNTER — HOSPITAL ENCOUNTER (OUTPATIENT)
Facility: HOSPITAL | Age: 69
Setting detail: HOSPITAL OUTPATIENT SURGERY
Discharge: HOME | End: 2024-11-05
Attending: UROLOGY | Admitting: UROLOGY
Payer: MEDICARE

## 2024-11-05 VITALS
OXYGEN SATURATION: 97 % | BODY MASS INDEX: 38.05 KG/M2 | HEART RATE: 59 BPM | SYSTOLIC BLOOD PRESSURE: 145 MMHG | WEIGHT: 206.79 LBS | DIASTOLIC BLOOD PRESSURE: 52 MMHG | HEIGHT: 62 IN | RESPIRATION RATE: 16 BRPM | TEMPERATURE: 97.9 F

## 2024-11-05 DIAGNOSIS — R31.9 HEMATURIA, UNSPECIFIED TYPE: ICD-10-CM

## 2024-11-05 DIAGNOSIS — N32.89 BLADDER WALL THICKENING: ICD-10-CM

## 2024-11-05 DIAGNOSIS — R30.0 DYSURIA: ICD-10-CM

## 2024-11-05 LAB
GLUCOSE BLDC GLUCOMTR-MCNC: 79 MG/DL (ref 70–99)
GLUCOSE BLDC GLUCOMTR-MCNC: 95 MG/DL (ref 70–99)

## 2024-11-05 PROCEDURE — 88305 TISSUE EXAM BY PATHOLOGIST: CPT | Performed by: UROLOGY

## 2024-11-05 PROCEDURE — 82948 REAGENT STRIP/BLOOD GLUCOSE: CPT | Performed by: ANESTHESIOLOGY

## 2024-11-05 PROCEDURE — 25010000002 CEFAZOLIN PER 500 MG: Performed by: UROLOGY

## 2024-11-05 PROCEDURE — 25010000002 LIDOCAINE PF 2% 2 % SOLUTION

## 2024-11-05 PROCEDURE — 25010000002 ONDANSETRON PER 1 MG

## 2024-11-05 PROCEDURE — 25010000002 MIDAZOLAM PER 1MG: Performed by: STUDENT IN AN ORGANIZED HEALTH CARE EDUCATION/TRAINING PROGRAM

## 2024-11-05 PROCEDURE — 25010000002 FENTANYL CITRATE (PF) 50 MCG/ML SOLUTION

## 2024-11-05 PROCEDURE — 25010000002 DEXAMETHASONE PER 1 MG

## 2024-11-05 PROCEDURE — 25010000002 KETOROLAC TROMETHAMINE PER 15 MG

## 2024-11-05 PROCEDURE — 52235 CYSTOSCOPY AND TREATMENT: CPT | Performed by: UROLOGY

## 2024-11-05 PROCEDURE — 25010000002 METOCLOPRAMIDE PER 10 MG: Performed by: STUDENT IN AN ORGANIZED HEALTH CARE EDUCATION/TRAINING PROGRAM

## 2024-11-05 PROCEDURE — 25010000002 PROPOFOL 200 MG/20ML EMULSION

## 2024-11-05 PROCEDURE — 25010000002 HYDROMORPHONE 1 MG/ML SOLUTION

## 2024-11-05 PROCEDURE — 82948 REAGENT STRIP/BLOOD GLUCOSE: CPT

## 2024-11-05 PROCEDURE — 25810000003 LACTATED RINGERS PER 1000 ML: Performed by: STUDENT IN AN ORGANIZED HEALTH CARE EDUCATION/TRAINING PROGRAM

## 2024-11-05 RX ORDER — SODIUM CHLORIDE 0.9 % (FLUSH) 0.9 %
10 SYRINGE (ML) INJECTION EVERY 12 HOURS SCHEDULED
Status: DISCONTINUED | OUTPATIENT
Start: 2024-11-05 | End: 2024-11-05 | Stop reason: HOSPADM

## 2024-11-05 RX ORDER — IBUPROFEN 600 MG/1
600 TABLET, FILM COATED ORAL EVERY 6 HOURS PRN
Status: DISCONTINUED | OUTPATIENT
Start: 2024-11-05 | End: 2024-11-05 | Stop reason: HOSPADM

## 2024-11-05 RX ORDER — FENTANYL CITRATE 50 UG/ML
INJECTION, SOLUTION INTRAMUSCULAR; INTRAVENOUS AS NEEDED
Status: DISCONTINUED | OUTPATIENT
Start: 2024-11-05 | End: 2024-11-05 | Stop reason: SURG

## 2024-11-05 RX ORDER — DEXAMETHASONE SODIUM PHOSPHATE 4 MG/ML
INJECTION, SOLUTION INTRA-ARTICULAR; INTRALESIONAL; INTRAMUSCULAR; INTRAVENOUS; SOFT TISSUE AS NEEDED
Status: DISCONTINUED | OUTPATIENT
Start: 2024-11-05 | End: 2024-11-05 | Stop reason: SURG

## 2024-11-05 RX ORDER — OXYCODONE HYDROCHLORIDE 5 MG/1
5 TABLET ORAL
Status: DISCONTINUED | OUTPATIENT
Start: 2024-11-05 | End: 2024-11-05 | Stop reason: HOSPADM

## 2024-11-05 RX ORDER — ACETAMINOPHEN 325 MG/1
650 TABLET ORAL ONCE
Status: DISCONTINUED | OUTPATIENT
Start: 2024-11-05 | End: 2024-11-05 | Stop reason: HOSPADM

## 2024-11-05 RX ORDER — PROMETHAZINE HYDROCHLORIDE 12.5 MG/1
25 TABLET ORAL ONCE AS NEEDED
Status: DISCONTINUED | OUTPATIENT
Start: 2024-11-05 | End: 2024-11-05 | Stop reason: HOSPADM

## 2024-11-05 RX ORDER — ONDANSETRON 2 MG/ML
4 INJECTION INTRAMUSCULAR; INTRAVENOUS ONCE AS NEEDED
Status: DISCONTINUED | OUTPATIENT
Start: 2024-11-05 | End: 2024-11-05 | Stop reason: HOSPADM

## 2024-11-05 RX ORDER — KETOROLAC TROMETHAMINE 30 MG/ML
INJECTION, SOLUTION INTRAMUSCULAR; INTRAVENOUS AS NEEDED
Status: DISCONTINUED | OUTPATIENT
Start: 2024-11-05 | End: 2024-11-05 | Stop reason: SURG

## 2024-11-05 RX ORDER — PROPOFOL 10 MG/ML
INJECTION, EMULSION INTRAVENOUS AS NEEDED
Status: DISCONTINUED | OUTPATIENT
Start: 2024-11-05 | End: 2024-11-05 | Stop reason: SURG

## 2024-11-05 RX ORDER — ONDANSETRON 2 MG/ML
INJECTION INTRAMUSCULAR; INTRAVENOUS AS NEEDED
Status: DISCONTINUED | OUTPATIENT
Start: 2024-11-05 | End: 2024-11-05 | Stop reason: SURG

## 2024-11-05 RX ORDER — SODIUM CHLORIDE, SODIUM LACTATE, POTASSIUM CHLORIDE, CALCIUM CHLORIDE 600; 310; 30; 20 MG/100ML; MG/100ML; MG/100ML; MG/100ML
9 INJECTION, SOLUTION INTRAVENOUS CONTINUOUS PRN
Status: DISCONTINUED | OUTPATIENT
Start: 2024-11-05 | End: 2024-11-05 | Stop reason: HOSPADM

## 2024-11-05 RX ORDER — SODIUM CHLORIDE 0.9 % (FLUSH) 0.9 %
10 SYRINGE (ML) INJECTION AS NEEDED
Status: DISCONTINUED | OUTPATIENT
Start: 2024-11-05 | End: 2024-11-05 | Stop reason: HOSPADM

## 2024-11-05 RX ORDER — PROMETHAZINE HYDROCHLORIDE 25 MG/1
25 SUPPOSITORY RECTAL ONCE AS NEEDED
Status: DISCONTINUED | OUTPATIENT
Start: 2024-11-05 | End: 2024-11-05 | Stop reason: HOSPADM

## 2024-11-05 RX ORDER — PROMETHAZINE HYDROCHLORIDE 12.5 MG/1
12.5 TABLET ORAL ONCE AS NEEDED
Status: DISCONTINUED | OUTPATIENT
Start: 2024-11-05 | End: 2024-11-05 | Stop reason: HOSPADM

## 2024-11-05 RX ORDER — ACETAMINOPHEN 500 MG
1000 TABLET ORAL ONCE
Status: COMPLETED | OUTPATIENT
Start: 2024-11-05 | End: 2024-11-05

## 2024-11-05 RX ORDER — METOCLOPRAMIDE HYDROCHLORIDE 5 MG/ML
10 INJECTION INTRAMUSCULAR; INTRAVENOUS EVERY 6 HOURS
Status: DISCONTINUED | OUTPATIENT
Start: 2024-11-05 | End: 2024-11-05 | Stop reason: HOSPADM

## 2024-11-05 RX ORDER — MIDAZOLAM HYDROCHLORIDE 2 MG/2ML
2 INJECTION, SOLUTION INTRAMUSCULAR; INTRAVENOUS ONCE
Status: COMPLETED | OUTPATIENT
Start: 2024-11-05 | End: 2024-11-05

## 2024-11-05 RX ORDER — LIDOCAINE HYDROCHLORIDE 20 MG/ML
INJECTION, SOLUTION EPIDURAL; INFILTRATION; INTRACAUDAL; PERINEURAL AS NEEDED
Status: DISCONTINUED | OUTPATIENT
Start: 2024-11-05 | End: 2024-11-05 | Stop reason: SURG

## 2024-11-05 RX ADMIN — ACETAMINOPHEN 1000 MG: 500 TABLET ORAL at 13:03

## 2024-11-05 RX ADMIN — KETOROLAC TROMETHAMINE 15 MG: 30 INJECTION, SOLUTION INTRAMUSCULAR; INTRAVENOUS at 14:12

## 2024-11-05 RX ADMIN — METOCLOPRAMIDE 10 MG: 5 INJECTION, SOLUTION INTRAMUSCULAR; INTRAVENOUS at 13:16

## 2024-11-05 RX ADMIN — FENTANYL CITRATE 100 MCG: 50 INJECTION, SOLUTION INTRAMUSCULAR; INTRAVENOUS at 13:27

## 2024-11-05 RX ADMIN — MIDAZOLAM HYDROCHLORIDE 2 MG: 1 INJECTION, SOLUTION INTRAMUSCULAR; INTRAVENOUS at 13:15

## 2024-11-05 RX ADMIN — HYDROMORPHONE HYDROCHLORIDE 0.5 MG: 1 INJECTION, SOLUTION INTRAMUSCULAR; INTRAVENOUS; SUBCUTANEOUS at 13:57

## 2024-11-05 RX ADMIN — SODIUM CHLORIDE 2000 MG: 9 INJECTION, SOLUTION INTRAVENOUS at 13:32

## 2024-11-05 RX ADMIN — OXYCODONE HYDROCHLORIDE 5 MG: 5 TABLET ORAL at 15:08

## 2024-11-05 RX ADMIN — LIDOCAINE HYDROCHLORIDE 100 MG: 20 INJECTION, SOLUTION EPIDURAL; INFILTRATION; INTRACAUDAL; PERINEURAL at 13:27

## 2024-11-05 RX ADMIN — ONDANSETRON 4 MG: 2 INJECTION INTRAMUSCULAR; INTRAVENOUS at 13:31

## 2024-11-05 RX ADMIN — SODIUM CHLORIDE, POTASSIUM CHLORIDE, SODIUM LACTATE AND CALCIUM CHLORIDE 9 ML/HR: 600; 310; 30; 20 INJECTION, SOLUTION INTRAVENOUS at 13:02

## 2024-11-05 RX ADMIN — PROPOFOL 110 MG: 10 INJECTION, EMULSION INTRAVENOUS at 13:27

## 2024-11-05 RX ADMIN — DEXAMETHASONE SODIUM PHOSPHATE 4 MG: 4 INJECTION, SOLUTION INTRAMUSCULAR; INTRAVENOUS at 13:31

## 2024-11-05 NOTE — ANESTHESIA POSTPROCEDURE EVALUATION
Patient: Aubrie Sanon    Procedure Summary       Date: 11/05/24 Room / Location: [facility] OR 07 / [facility] MAIN OR    Anesthesia Start: 1323 Anesthesia Stop: 1421    Procedure: CYSTOSCOPY BLADDER BIOPSY WITH FULGURATION  Scope of the bladder and biopsy of bladder tissue with burning of bladder tissue to prevent bleeding (Bladder) Diagnosis:       Bladder wall thickening      Dysuria      Hematuria, unspecified type      (Bladder wall thickening [N32.89])      (Dysuria [R30.0])      (Hematuria, unspecified type [R31.9])    Surgeons: Fadumo Silva MD Provider: Roseann Quintero CRNA    Anesthesia Type: general ASA Status: 3            Anesthesia Type: general    Vitals  Vitals Value Taken Time   /64 11/05/24 1450   Temp 36.3 °C (97.3 °F) 11/05/24 1445   Pulse 58 11/05/24 1453   Resp 18 11/05/24 1445   SpO2 97 % 11/05/24 1453   Vitals shown include unfiled device data.        Post Anesthesia Care and Evaluation    Patient location during evaluation: bedside  Patient participation: complete - patient participated  Level of consciousness: awake    Airway patency: patent  PONV Status: none  Cardiovascular status: acceptable  Respiratory status: acceptable  Hydration status: acceptable

## 2024-11-05 NOTE — OP NOTE
CYSTOSCOPY BLADDER BIOPSY  Procedure Report    Patient Name:  Aubrie Sanon  YOB: 1955    Date of Surgery:  11/5/2024     Pre-op Diagnosis:   Bladder wall thickening [N32.89]  Dysuria [R30.0]  Hematuria, unspecified type [R31.9]       Post-Op Diagnosis Codes:     * Bladder wall thickening [N32.89]     * Dysuria [R30.0]     * Hematuria, unspecified type [R31.9]      Procedure/CPT® Codes:    Procedure(s):  CYSTOSCOPY BLADDER BIOPSY WITH FULGURATION        Staff:  Surgeon(s):  Fadumo Silva MD    Assistant: Raza Vieira RN CSA    Anesthesia: General    Estimated Blood Loss: 2 mL    Implants:    Nothing was implanted during the procedure    Specimen:          Specimens       ID Source Type Tests Collected By Collected At Frozen?    A Urinary Bladder Tissue TISSUE PATHOLOGY EXAM   Fdaumo Silva MD 11/5/24 2602     Description: BLADDER BIOPSY x 3                Complications: None    Description of Procedure:     After proper consent was obtained, patient was taken to operating room and MAC anesthesia was performed.  The patient was placed in the dorsal lithotomy position and prepped and draped in the normal sterile fashion for cystoscopy.       A 22 [place] rigid cystoscope was inserted into the bladder.  The bladder was inspected in a systemic meridian fashion using a 30 degree lens.  There were 2 areas of ulceration appearing spots on the bladder.  1 was on the left lateral wall moons on the right lateral wall..  Both ureteral orifices were normal in appearance.       Biopsies were taken of the suspicious areas using biopsy forceps.  The areas were then fulgerated with a bugbee.  There areas were about 3-4 cm in surface area in size.       The bladder was emptied and the cystoscope removed.       The patient tolerated the procedure well and was transferred to the PACU in stable condition.      Assistant: Raza Vieira RN CSA  was responsible for performing the following  activities: Irrigation and their skilled assistance was necessary for the success of this case.    Fadumo Silva MD     Date: 11/5/2024  Time: 14:50 EST

## 2024-11-05 NOTE — DISCHARGE INSTRUCTIONS
DISCHARGE INSTRUCTIONS CYSTOSCOPY      For your surgery you had:  General anesthesia (you may have a sore throat for the first 24 hours)     You may experience dizziness, drowsiness, or lightheadedness for several hours following surgery.  Do not stay alone today or tonight.  Limit your activity for 24 hours.  You should not drive, operate machinery, drink alcohol, or sign legally binding documents for 24 hours or while you are taking pain medication.  Resume your diet slowly.  Follow any special dietary instructions you may have been given by your doctor.    NOTIFY YOUR DOCTOR IF YOU EXPERIENCE ANY OF THE FOLLOWING:  Temperature greater than 101 degrees Fahrenheit  Shaking Chills  Redness or excessive drainage from incision  Nausea, vomiting and/or pain that is not controlled by prescribed medications  Increase in bleeding or bleeding that is excessive  Unable to urinate in 6 hours after surgery  If unable to reach your doctor, please go to the closest Emergency Room   Following your cystoscopy exam, you may experience burning upon urination.  You may also pass some bloody urine.  If the burning sensation and/or bloody urine should persist beyond 48 hours, call your doctor.  To encourage kidney and bladder function, you should drink as much fluid as possible.  If you have difficulty urinating, try sitting in a bath tub of warm water.  If you become uncomfortable because you cannot urinate, call your doctor or come to the Emergency Room at the hospital.  Medications per physician instructions as indicated on Discharge Medication Information Sheet.      Last dose of pain medication given at:   Tylenol 1000 mg given at 1:03. Do not exceed 4000 mg in a 24 hour period.  Toradol given at 2:12. May have ibuprofen at 8:12.  Oxycodone given at 3:08.    SPECIAL INSTRUCTIONS:

## 2024-11-05 NOTE — ANESTHESIA PREPROCEDURE EVALUATION
Anesthesia Evaluation     Patient summary reviewed and Nursing notes reviewed   no history of anesthetic complications:   NPO Solid Status: > 8 hours  NPO Liquid Status: > 2 hours           Airway   Mallampati: II  TM distance: >3 FB  Neck ROM: full  No difficulty expected  Dental          Pulmonary - normal exam    breath sounds clear to auscultation  (+) COPD, asthma,  Cardiovascular - normal exam  Exercise tolerance: good (4-7 METS)    Patient on routine beta blocker and Beta blocker not taken-may be given intraoperatively  Rhythm: regular  Rate: normal    (+) hypertension      Neuro/Psych  (+) psychiatric history Depression  GI/Hepatic/Renal/Endo    (+) obesity, GERD, diabetes mellitus type 2 poorly controlled using insulin    Musculoskeletal     Abdominal    Substance History - negative use     OB/GYN negative ob/gyn ROS         Other   arthritis,     ROS/Med Hx Other: PAT Nursing Notes unavailable.                     Anesthesia Plan    ASA 3     general     (Patient understands anesthesia not responsible for dental damage.)  intravenous induction     Anesthetic plan, risks, benefits, and alternatives have been provided, discussed and informed consent has been obtained with: patient.    Use of blood products discussed with patient .    Plan discussed with CRNA.        CODE STATUS:

## 2024-11-14 ENCOUNTER — TRANSCRIBE ORDERS (OUTPATIENT)
Dept: ADMINISTRATIVE | Facility: HOSPITAL | Age: 69
End: 2024-11-14
Payer: MEDICARE

## 2024-11-14 DIAGNOSIS — N83.202 CYST OF LEFT OVARY: Primary | ICD-10-CM

## 2024-11-15 ENCOUNTER — OFFICE VISIT (OUTPATIENT)
Dept: UROLOGY | Age: 69
End: 2024-11-15
Payer: MEDICARE

## 2024-11-15 VITALS
BODY MASS INDEX: 37.91 KG/M2 | WEIGHT: 206 LBS | DIASTOLIC BLOOD PRESSURE: 71 MMHG | HEIGHT: 62 IN | SYSTOLIC BLOOD PRESSURE: 139 MMHG

## 2024-11-15 DIAGNOSIS — R31.9 HEMATURIA, UNSPECIFIED TYPE: Primary | ICD-10-CM

## 2024-11-15 LAB
BILIRUB BLD-MCNC: NEGATIVE MG/DL
CLARITY, POC: CLEAR
COLOR UR: YELLOW
EXPIRATION DATE: ABNORMAL
GLUCOSE UR STRIP-MCNC: NEGATIVE MG/DL
KETONES UR QL: NEGATIVE
LEUKOCYTE EST, POC: ABNORMAL
Lab: ABNORMAL
NITRITE UR-MCNC: NEGATIVE MG/ML
PH UR: 5.5 [PH] (ref 5–8)
PROT UR STRIP-MCNC: ABNORMAL MG/DL
RBC # UR STRIP: NEGATIVE /UL
SP GR UR: 1.03 (ref 1–1.03)
UROBILINOGEN UR QL: ABNORMAL

## 2024-11-15 NOTE — PROGRESS NOTES
"Chief Complaint  Microscopic hematuria    Subjective          Aubrie Sanon presents to [facility] UROLOGY  History of Present Illness  Ms. Sanon is here status post CBF last week.  Biopsies revealed chronic inflammation.  No cancer was seen.  She states she is feeling better from a bladder standpoint since that procedure.  She did have 2 ulcers that I fulgurated.      Objective   Vital Signs:   /71   Ht 157.5 cm (62\")   Wt 93.4 kg (206 lb)   BMI 37.68 kg/m²       Physical Exam  Vitals and nursing note reviewed.   Constitutional:       Appearance: Normal appearance. She is well-developed.   Pulmonary:      Effort: Pulmonary effort is normal.      Breath sounds: Normal air entry.   Neurological:      Mental Status: She is alert and oriented to person, place, and time.      Motor: Motor function is intact.   Psychiatric:         Mood and Affect: Mood normal.         Behavior: Behavior normal.          Result Review :   The following data was reviewed by: Fadumo Silva MD on 11/15/2024:    Results for orders placed or performed in visit on 11/15/24   POC Urinalysis Dipstick, Automated    Collection Time: 11/15/24 11:42 AM    Specimen: Urine   Result Value Ref Range    Color Yellow Yellow, Straw, Dark Yellow, Connie    Clarity, UA Clear Clear    Specific Gravity  1.030 1.005 - 1.030    pH, Urine 5.5 5.0 - 8.0    Leukocytes Small (1+) (A) Negative    Nitrite, UA Negative Negative    Protein, POC 30 mg/dL (A) Negative mg/dL    Glucose, UA Negative Negative mg/dL    Ketones, UA Negative Negative    Urobilinogen, UA 0.2 E.U./dL Normal, 0.2 E.U./dL    Bilirubin Negative Negative    Blood, UA Negative Negative    Lot Number [id number]     Expiration Date 92,025        Case Report   Surgical Pathology Report                         Case: [id number]                                   Authorizing Provider:  Fadumo Silva MD      Collected:           11/05/2024 01:58 PM           Ordering " Location:     [facility] MAIN Received:            11/06/2024 09:08 AM                                  OR                                                                           Pathologist:           Lidia Garcia DO                                                       Specimen:    Urinary Bladder, BLADDER BIOPSY x 3                                                        Clinical Information    Bladder wall thickening  Dysuria  Hematuria, unspecified type   Final Diagnosis   Urinary bladder,  biopsy:               - Chronic cystitis               - Vascular congestion      Electronically signed by Lidia Garcia DO on 11/7/2024 at 1539            Assessment and Plan    Diagnoses and all orders for this visit:    1. Hematuria, unspecified type (Primary)  -     POC Urinalysis Dipstick, Automated    Her biopsies were negative.  She will follow back up in 6 months or sooner if needed.  She does report that she has got some prolapse and I did encourage her to talk to her gynecologist and/or see a urogynecologist for that.        Follow Up       No follow-ups on file.  Patient was given instructions and counseling regarding her condition or for health maintenance advice. Please see specific information pulled into the AVS if appropriate.

## 2024-12-09 ENCOUNTER — HOSPITAL ENCOUNTER (OUTPATIENT)
Dept: ULTRASOUND IMAGING | Facility: HOSPITAL | Age: 69
Discharge: HOME | End: 2024-12-09
Payer: MEDICARE

## 2024-12-09 DIAGNOSIS — N83.202 CYST OF LEFT OVARY: ICD-10-CM

## 2024-12-09 PROCEDURE — 76856 US EXAM PELVIC COMPLETE: CPT

## 2025-05-19 ENCOUNTER — OFFICE VISIT (OUTPATIENT)
Dept: UROLOGY | Age: 70
End: 2025-05-19
Payer: MEDICARE

## 2025-05-19 ENCOUNTER — PREP FOR SURGERY (OUTPATIENT)
Dept: OTHER | Facility: HOSPITAL | Age: 70
End: 2025-05-19
Payer: MEDICARE

## 2025-05-19 VITALS — HEIGHT: 62 IN | WEIGHT: 199 LBS | RESPIRATION RATE: 14 BRPM | BODY MASS INDEX: 36.62 KG/M2

## 2025-05-19 DIAGNOSIS — R31.9 HEMATURIA, UNSPECIFIED TYPE: Primary | ICD-10-CM

## 2025-05-19 DIAGNOSIS — N32.89 BLADDER WALL THICKENING: ICD-10-CM

## 2025-05-19 LAB
BILIRUB BLD-MCNC: NEGATIVE MG/DL
CLARITY, POC: CLEAR
COLOR UR: YELLOW
EXPIRATION DATE: ABNORMAL
GLUCOSE UR STRIP-MCNC: NEGATIVE MG/DL
KETONES UR QL: NEGATIVE
LEUKOCYTE EST, POC: ABNORMAL
Lab: ABNORMAL
NITRITE UR-MCNC: NEGATIVE MG/ML
PH UR: 6 [PH] (ref 5–8)
PROT UR STRIP-MCNC: ABNORMAL MG/DL
RBC # UR STRIP: ABNORMAL /UL
SP GR UR: 1.02 (ref 1–1.03)
UROBILINOGEN UR QL: ABNORMAL

## 2025-05-19 PROCEDURE — 87077 CULTURE AEROBIC IDENTIFY: CPT | Performed by: UROLOGY

## 2025-05-19 PROCEDURE — 87086 URINE CULTURE/COLONY COUNT: CPT | Performed by: UROLOGY

## 2025-05-19 PROCEDURE — 87186 SC STD MICRODIL/AGAR DIL: CPT | Performed by: UROLOGY

## 2025-05-19 RX ORDER — SODIUM CHLORIDE 0.9 % (FLUSH) 0.9 %
10 SYRINGE (ML) INJECTION EVERY 12 HOURS SCHEDULED
OUTPATIENT
Start: 2025-05-19

## 2025-05-19 RX ORDER — HYDROXYZINE HYDROCHLORIDE 25 MG/1
25 TABLET, FILM COATED ORAL DAILY
COMMUNITY

## 2025-05-19 RX ORDER — L. ACIDOPHILUS/L.BULGARICUS 1MM CELL
1 TABLET ORAL 4 TIMES DAILY
COMMUNITY
Start: 2025-04-28 | End: 2025-05-28

## 2025-05-19 RX ORDER — SODIUM CHLORIDE 0.9 % (FLUSH) 0.9 %
10 SYRINGE (ML) INJECTION AS NEEDED
OUTPATIENT
Start: 2025-05-19

## 2025-05-19 RX ORDER — SODIUM CHLORIDE 9 MG/ML
40 INJECTION, SOLUTION INTRAVENOUS AS NEEDED
OUTPATIENT
Start: 2025-05-19

## 2025-05-19 RX ORDER — OLOPATADINE HYDROCHLORIDE 2 MG/ML
1 SOLUTION/ DROPS OPHTHALMIC DAILY
COMMUNITY
Start: 2025-02-25

## 2025-05-19 NOTE — PROGRESS NOTES
"Chief Complaint  Blood in Urine    Subjective            Aubrie Sanon presents to [facility] UROLOGY    History of Present Illness  The patient presents for evaluation of urinary frequency.    She was recently diagnosed with a bloodstream infection at [facility], which was managed with a 10-day course of Rocephin. The treatment included a 7-day hospital stay and 3 days of outpatient intravenous therapy at a clinic. Following this, she has been experiencing persistent urinary frequency, necessitating the use of 5 pads within a 12-hour period. She also reports mild dysuria. She has not undergone any recent cystoscopy. She has previously tried trospium chloride without success and found Gemtesa to be cost-prohibitive. She has a history of bladder ablation, which initially provided relief but was followed by hematuria.    She has a history of urethral prolapse and rectocele, as diagnosed by Dr. Fadumo Gruber. Despite being informed that these conditions could be addressed simultaneously, she was later told otherwise. She has previously received Botox injections, which were discontinued due to her physician's impending departure. She has expressed a desire to return to Dr. Gruber for further management.    MEDICATIONS  Past: Rocephin, trospium chloride, solifenacin, Gemtesa       Objective   Vital Signs:   Resp 14   Ht 157.5 cm (62\")   Wt 90.3 kg (199 lb)   BMI 36.40 kg/m²       Physical Exam  Vitals and nursing note reviewed.   Constitutional:       Appearance: Normal appearance. She is well-developed.   Pulmonary:      Effort: Pulmonary effort is normal.      Breath sounds: Normal air entry.   Neurological:      Mental Status: She is alert and oriented to person, place, and time.      Motor: Motor function is intact.   Psychiatric:         Mood and Affect: Mood normal.         Behavior: Behavior normal.          Result Review :   The following data was reviewed by: Fadumo Silva MD " on 05/19/2025:    Results for orders placed or performed in visit on 05/19/25   POC Urinalysis Dipstick, Automated    Collection Time: 05/19/25  1:55 PM    Specimen: Urine   Result Value Ref Range    Color Yellow Yellow, Straw, Dark Yellow, Connie    Clarity, UA Clear Clear    Specific Gravity  1.020 1.005 - 1.030    pH, Urine 6.0 5.0 - 8.0    Leukocytes Large (3+) (A) Negative    Nitrite, UA Negative Negative    Protein,  mg/dL (A) Negative mg/dL    Glucose, UA Negative Negative mg/dL    Ketones, UA Negative Negative    Urobilinogen, UA 0.2 E.U./dL Normal, 0.2 E.U./dL    Bilirubin Negative Negative    Blood, UA Large (A) Negative    Lot Number [id number]     Expiration Date 3/26          Results  Laboratory Studies  Urine test shows presence of white blood cells and red blood cells.    Imaging  Ultrasound shows a 3.3 cm cystic lesion on the left ovary and an abnormal soft tissue lesion in the bladder lumen adjacent to the bladder wall measuring 2 cm.              Assessment and Plan    Diagnoses and all orders for this visit:    1. Hematuria, unspecified type (Primary)  -     POC Urinalysis Dipstick, Automated      Assessment & Plan  1. Urinary frequency.  The presence of leukocytes and erythrocytes in her urine suggests a possible ongoing infection. However, her history of bladder ulcers could also contribute to her symptoms. An ultrasound revealed a 3.3 cm cystic lesion on her left ovary and an abnormal soft tissue lesion in the bladder lumen adjacent to the bladder wall measuring 2 cm, raising concerns for a neoplasm. A CT scan or cystoscopy was recommended. She has previously tried trospium chloride and solifenacin without success. A trial of Myrbetriq will be initiated. A cystoscopy will be scheduled to further investigate the cause of her symptoms. If any ulcers are identified during the procedure, they will be cauterized.    2. Urethral prolapse.  She has a history of urethral prolapse and has previously  received Botox injections. Given her history of surgeries, medication and Botox are the primary treatment options. If Myrbetriq proves ineffective or if prolapse issues persist, a referral to urogynecology will be considered.    PROCEDURE  The patient has a history of bladder ablation, which initially provided relief but was followed by hematuria.          Follow Up       No follow-ups on file.  Patient was given instructions and counseling regarding her condition or for health maintenance advice. Please see specific information pulled into the AVS if appropriate.     Transcribed from ambient dictation for Fadumo Silva MD by Fadumo Silva MD.  05/19/25   13:56 EDT    Patient or patient representative verbalized consent for the use of Ambient Listening during the visit with  Fadumo Silva MD for chart documentation. 5/19/2025  14:35 EDT

## 2025-05-19 NOTE — H&P
[facility]   Urology HISTORY AND PHYSICAL    Patient Name: Aubrie Sanon  : 1955  MRN: [id number]  Primary Care Physician:  Kendell Yanez, ARIAN  Date of admission: (Not on file)    Subjective   Subjective       History of Present Illness  Patient has bladder ulcer and presents for cystoscopy, bladder biopsy and fulguration      Personal History     Past Medical History:   Diagnosis Date    Anxiety     Arthritis     OSTEOPOROSIS LEFT HIP    Asthma     COPD (chronic obstructive pulmonary disease)     EMPHYSEMA- INHALERS PRN    Depression     Diabetes     DOESN'T CHECK BG OFTEN AT HOME    Heart murmur     ASYMPTOMATIC. DENIES CP/SOB, SEE'S PCP    Hematuria CURRENTLY    Hypertension        Past Surgical History:   Procedure Laterality Date    BLADDER REPAIR      COLONOSCOPY      COLONOSCOPY N/A 3/1/2022    Procedure: COLONOSCOPY,  with polypectomy;  Surgeon: Domenico Suarez MD;  Location: [facility] ENDOSCOPY;  Service: Gastroenterology;  Laterality: N/A;  colon polyps and diverticulosis    CYSTOSCOPY      CYSTOSCOPY BLADDER BIOPSY N/A 2021    Procedure: CYSTOSCOPY BLADDER BIOPSY WITH FULGURATION;  Surgeon: Fadumo Silva MD;  Location: [facility] OR;  Service: Urology;  Laterality: N/A;    CYSTOSCOPY BLADDER BIOPSY N/A 2024    Procedure: CYSTOSCOPY BLADDER BIOPSY WITH FULGURATION  Scope of the bladder and biopsy of bladder tissue with burning of bladder tissue to prevent bleeding;  Surgeon: Fadumo Silva MD;  Location: [facility] OR;  Service: Urology;  Laterality: N/A;    HYSTERECTOMY      PARTIAL HYST- RETROCELE, PROLAPSED UTERUS REPAIR    JOINT REPLACEMENT      RIGHT TKR    KNEE SURGERY Right     RIGHT TKR    OTHER SURGICAL HISTORY      RECTOCELE REPAIR    UPPER GASTROINTESTINAL ENDOSCOPY  2015    Dr. Mark Miguel       Family History: family history includes Cancer in her brother and paternal grandmother; Colon cancer (age of onset: 80) in her paternal  grandmother. Otherwise pertinent FHx was reviewed and not pertinent to current issue.    Social History:  reports that she quit smoking about 25 years ago. Her smoking use included cigarettes. She started smoking about 26 years ago. She has been exposed to tobacco smoke. She has never used smokeless tobacco. She reports that she does not drink alcohol and does not use drugs.    Home Medications:  Insulin Lispro, acidophilus, albuterol sulfate HFA, aspirin, buPROPion XL, cholecalciferol, clonazePAM, estradiol, hydrOXYzine, insulin glargine, isosorbide mononitrate, lisinopril, metaxalone, metoprolol succinate XL, montelukast, olopatadine, simvastatin, and traZODone    Allergies:  Allergies   Allergen Reactions    Carisoprodol Hives and Unknown (See Comments)    Contrast Dye (Echo Or Unknown Ct/Mr) Hives and Itching    Iodinated Contrast Media Hives and Itching    Other Hives, Itching and Other (See Comments)    Methocarbamol Hives    Gadolinium Derivatives Other (See Comments)       Objective    Objective     Vitals:   Resp:  [14] 14    Physical Exam  Constitutional:       Appearance: Normal appearance.   Cardiovascular:      Rate and Rhythm: Normal rate and regular rhythm.   Pulmonary:      Effort: Pulmonary effort is normal.      Breath sounds: Normal breath sounds.   Neurological:      Mental Status: She is alert. Mental status is at baseline.   Psychiatric:         Mood and Affect: Mood and affect normal.         Speech: Speech normal.         Judgment: Judgment normal.         Result Review    Result Review:  I have personally reviewed the results from the time of this admission to 5/19/2025 14:35 EDT and agree with these findings:  [x]  Laboratory  []  Microbiology  [x]  Radiology  []  EKG/Telemetry   []  Cardiology/Vascular   [x]  Pathology  [x]  Old records  []  Other:      Assessment & Plan   Assessment / Plan       Active Hospital Problems:  There are no active hospital problems to display for this  patient.      Plan: cystoscopy, bladder biopsy and fulguration  Risks and benefits discussed with patient and they are agreeable to proceed.    VTE Prophylaxis:  No VTE prophylaxis order currently exists.        CODE STATUS:           Electronically signed by Fadumo Silva MD, 05/19/25, 2:35 PM EDT.

## 2025-05-21 ENCOUNTER — RESULTS FOLLOW-UP (OUTPATIENT)
Dept: UROLOGY | Age: 70
End: 2025-05-21
Payer: MEDICARE

## 2025-05-21 DIAGNOSIS — N30.00 ACUTE CYSTITIS WITHOUT HEMATURIA: ICD-10-CM

## 2025-05-21 DIAGNOSIS — Z87.440 HISTORY OF RECURRENT UTIS: Primary | ICD-10-CM

## 2025-05-21 LAB — BACTERIA SPEC AEROBE CULT: ABNORMAL

## 2025-05-21 RX ORDER — CEPHALEXIN 500 MG/1
500 CAPSULE ORAL 3 TIMES DAILY
Qty: 42 CAPSULE | Refills: 0 | Status: SHIPPED | OUTPATIENT
Start: 2025-05-21 | End: 2025-06-04

## 2025-06-03 ENCOUNTER — TELEPHONE (OUTPATIENT)
Dept: UROLOGY | Age: 70
End: 2025-06-03
Payer: MEDICARE

## 2025-06-03 NOTE — TELEPHONE ENCOUNTER
Patient called back and we rescheduled for 7/1/25. I instructed to resume aspirin, which patient had stopped in anticipation for procedure on Thursday, and to hold for 5 days prior to procedure on 7/1. Patient voiced understanding.

## 2025-06-03 NOTE — TELEPHONE ENCOUNTER
Lin called patient and informed that Dr. Silva was still ok to do the procedure and left it up to the patient. Patient wants to reschedule. Lin informed patient that I would call back to reschedule. Patient voiced understanding.

## 2025-06-03 NOTE — TELEPHONE ENCOUNTER
PATIENT CALLED AND IS SCHEDULED FOR SURGERY ON 06/05/25.  SHE SAID HER LEGS ARE SWOLLEN AND SHE IS HAVING TO WALK USING A WALKER.    DOES DR. NINA WANT HER TO RESCHEDULE?    #[phone]

## (undated) DEVICE — SKIN PREP TRAY W/CHG: Brand: MEDLINE INDUSTRIES, INC.

## (undated) DEVICE — TRY PREP SCRB VAG PVP

## (undated) DEVICE — NON-ADHERENT PADS PREPACK: Brand: TELFA

## (undated) DEVICE — GOWN,REINFORCE,POLY,SIRUS,BREATH SLV,XLG: Brand: MEDLINE

## (undated) DEVICE — SNAR E/S POLYP SNAREMASTER OVL/10MM 2.8X2300MM YEL

## (undated) DEVICE — CYSTO PACK: Brand: MEDLINE INDUSTRIES, INC.

## (undated) DEVICE — CORD ENDO BOVIE 1P/U 10FT

## (undated) DEVICE — Device

## (undated) DEVICE — PAD GRND REM POLYHESIVE A/ DISP

## (undated) DEVICE — COLON KIT: Brand: MEDLINE INDUSTRIES, INC.

## (undated) DEVICE — GLOVE,SURG,SENSICARE SLT,LF,PF,6.5: Brand: MEDLINE

## (undated) DEVICE — SOL IRRG H2O PL/BG 1000ML STRL

## (undated) DEVICE — KT SYR GEL ORISE SNGL PK 10ML

## (undated) DEVICE — Y-TYPE TUR/BLADDER IRRIGATION SET, REGULATING CLAMP

## (undated) DEVICE — SOL IRR H2O BG 1000ML STRL

## (undated) DEVICE — SOL IRRG H2O BG 3000ML STRL

## (undated) DEVICE — Device: Brand: DEFENDO AIR/WATER/SUCTION AND BIOPSY VALVE

## (undated) DEVICE — GLV SURG SENSICARE SLT PF LF 6.5 STRL

## (undated) DEVICE — THE SINGLE USE ETRAP – POLYP TRAP IS USED FOR SUCTION RETRIEVAL OF ENDOSCOPICALLY REMOVED POLYPS.: Brand: ETRAP

## (undated) DEVICE — ELECTRD FULGURATING BUGBEE 5F58CM

## (undated) DEVICE — DRSNG TELFA PAD NONADH STR 1S 3X4IN